# Patient Record
Sex: MALE | Race: WHITE | Employment: OTHER | URBAN - METROPOLITAN AREA
[De-identification: names, ages, dates, MRNs, and addresses within clinical notes are randomized per-mention and may not be internally consistent; named-entity substitution may affect disease eponyms.]

---

## 2017-01-16 ENCOUNTER — OFFICE VISIT (OUTPATIENT)
Dept: INTERNAL MEDICINE CLINIC | Age: 62
End: 2017-01-16

## 2017-01-16 VITALS
WEIGHT: 212.7 LBS | BODY MASS INDEX: 28.19 KG/M2 | HEIGHT: 73 IN | OXYGEN SATURATION: 98 % | RESPIRATION RATE: 16 BRPM | SYSTOLIC BLOOD PRESSURE: 130 MMHG | TEMPERATURE: 96.6 F | HEART RATE: 77 BPM | DIASTOLIC BLOOD PRESSURE: 70 MMHG

## 2017-01-16 DIAGNOSIS — M25.551 HIP PAIN, CHRONIC, RIGHT: Primary | ICD-10-CM

## 2017-01-16 DIAGNOSIS — Z23 ENCOUNTER FOR IMMUNIZATION: ICD-10-CM

## 2017-01-16 DIAGNOSIS — G89.29 HIP PAIN, CHRONIC, RIGHT: Primary | ICD-10-CM

## 2017-01-16 NOTE — PROGRESS NOTES
Chief Complaint   Patient presents with    Hip Pain     right     Reviewed record in preparation for visit and have obtained necessary documentation. Identified pt with two pt identifiers(name and ). Health Maintenance Due   Topic    ZOSTER VACCINE AGE 60>     COLONOSCOPY     INFLUENZA AGE 9 TO ADULT          Chief Complaint   Patient presents with    Hip Pain     right        Wt Readings from Last 3 Encounters:   17 212 lb 11.2 oz (96.5 kg)   08/10/16 207 lb (93.9 kg)   16 209 lb (94.8 kg)     Temp Readings from Last 3 Encounters:   17 96.6 °F (35.9 °C) (Oral)   08/10/16 98.6 °F (37 °C) (Oral)   16 98 °F (36.7 °C) (Oral)     BP Readings from Last 3 Encounters:   17 130/70   08/10/16 124/70   16 120/62     Pulse Readings from Last 3 Encounters:   17 77   08/10/16 67   16 78           Learning Assessment:  :     Learning Assessment 2015 3/6/2014   PRIMARY LEARNER Patient Patient   HIGHEST LEVEL OF EDUCATION - PRIMARY LEARNER  SOME COLLEGE SOME COLLEGE   BARRIERS PRIMARY LEARNER NONE NONE   CO-LEARNER CAREGIVER No No   PRIMARY LANGUAGE ENGLISH ENGLISH    NEED No No   LEARNER PREFERENCE PRIMARY DEMONSTRATION VIDEOS   LEARNING SPECIAL TOPICS no no   ANSWERED BY patient patient   RELATIONSHIP SELF SELF       Depression Screening:  :     PHQ 2 / 9, over the last two weeks 8/10/2016   Little interest or pleasure in doing things Not at all   Feeling down, depressed or hopeless Not at all   Total Score PHQ 2 0       Fall Risk Assessment:  :     No flowsheet data found. Abuse Screening:  :     Abuse Screening Questionnaire 2015 2014 3/6/2014   Do you ever feel afraid of your partner? N N N   Are you in a relationship with someone who physically or mentally threatens you? N N N   Is it safe for you to go home?  Lino Berman       Coordination of Care Questionnaire:  :     1) Have you been to an emergency room, urgent care clinic since your last visit? no   Hospitalized since your last visit? no             2) Have you seen or consulted any other health care providers outside of [address] [place] [address] since your last visit? no  (Include any pap smears or colon screenings in this section.)    3) Do you have an Advance Directive on file? no    4) Are you interested in receiving information on Advance Directives? NO      Patient is accompanied by self I have received verbal consent from Eve Chaves to discuss any/all medical information while they are present in the room. Reviewed record  In preparation for visit and have obtained necessary documentation.

## 2017-01-16 NOTE — PROGRESS NOTES
Hip Pain (right)       HPI:  Emil Jacobson is a 57y.o. year old male who is here to establish care. He  had his medical care:     He reports the following history and medical concerns:      Ganglion cyst, torn labrum, and arthritis. Right hip    Started 20 years ago- several cortisone shots and it helped. Living with discomfort 6-8 months. Moderate arthritis on MRI. Ortho suggests doing surgery to 'clean things up' as per patient and possibly needing hip replacement. Trouble putting pressure on it when walking. Not when laying down. No numbness or tingling. Brother had relief with acupuncture with me and he wanted to see if I can help him and avoid surgery. Assessment and Plan        1. Hip pain, chronic, right  Patient has good ROM but pain with walking on right hip that has caused also discomfort in left knee. I told him that acupuncture would not relieve any structural defects of his hip and may only reduce the discomfort or part of the discomfort when walking. I have not seen the MRI and therefore told him that he should correspond with his orthopedics doctor about surgery and if he can delay it to see if this helps. However, delaying surgery may make things worse and he would need to discuss that with them.     TIME OUT performed immediately prior to start of procedure:   Hilary Frazier MD, have performed the following reviews on Emil Jacobson   prior to the start of the procedure:     * Patient was identified by name and date of birth   * Agreement on procedure being performed was verified   * Risks and Benefits explained to the patient   * Procedure site verified and marked as necessary   * Patient was positioned for comfort   * Consent was given by patient    Date of procedure: 1/16/2017  Procedure performed by:Chan Figueroa MD   Patient assisted by: self   How tolerated by patient: tolerated the procedure well with no complications   Comments: none         Patient explained the risks and benefits of acupuncture to help with the acute problem. There can be some soreness afterwards and the effect can be immediate to slightly delayed (2-3 days). If the problem persists or gets worse, please call back or send a my chart message. If you experience shortness of breath, please let me know immediately. Patient agreed to proceed with treatment. Seirin packaged needle used No. 5 (0.25) 30 mm  QTY Five  Points palpated and inserted 5-10 mm at marked points  Patient tolerated procedure and felt partial relief. 2. Encounter for immunization  Immunization given. Discussed risks and benefits. Side effects. - Influenza vaccine (QUADRIVALENT VIAL) 3 years and older IM              Visit Vitals    /70 (BP 1 Location: Left arm, BP Patient Position: Sitting)    Pulse 77    Temp 96.6 °F (35.9 °C) (Oral)    Resp 16    Ht 6' 1\" (1.854 m)    Wt 212 lb 11.2 oz (96.5 kg)    SpO2 98%    BMI 28.06 kg/m2       Historical Data    Past Medical History   Diagnosis Date    Back problem     BPH (benign prostatic hyperplasia)     Elevated PSA measurement     H/O prostate biopsy      x 3    Hyperlipemia     Hypertension     Osteoarthritis     Prediabetes        Past Surgical History   Procedure Laterality Date    Hx orthopaedic       multiple sites - both knees    Hx orthopaedic Right 2014     R Hand trigger fingers 2 & 3    Hx other surgical Right May 2016     Retinal Tear    Hx orthopaedic Right 2010     Rot Cuff Rep & R elbow tendon    Hx other surgical  early 2000s     Prostate Bx x3    Hx tonsillectomy  1970    Hx cataract removal Bilateral 1/29/07 & 8/27/08    Hx other surgical Left 6/2016      ret[place] repair       Outpatient Encounter Prescriptions as of 1/16/2017   Medication Sig Dispense Refill    atorvastatin (LIPITOR) 40 mg tablet Take 1 Tab by mouth daily. 30 Tab 11    valsartan (DIOVAN) 160 mg tablet Take 1 Tab by mouth daily.  30 Tab 11    furosemide (LASIX) 40 mg tablet Take 1 Tab by mouth daily. 30 Tab 11    multivitamin (ONE A DAY) tablet Take 1 Tab by mouth daily.  alfuzosin SR (UROXATRAL) 10 mg SR tablet Take  by mouth daily.  aspirin delayed-release 81 mg tablet Take 1 Tab by mouth daily. 30 Tab 11     No facility-administered encounter medications on file as of 1/16/2017. No Known Allergies     Social History     Social History    Marital status:      Spouse name: N/A    Number of children: N/A    Years of education: N/A     Occupational History          Social History Main Topics    Smoking status: Current Every Day Smoker     Packs/day: 0.50     Years: 40.00     Types: Cigarettes    Smokeless tobacco: Never Used      Comment: E-Cig? - started in his teens    Alcohol use Yes      Comment: 1x/wk    Drug use: No    Sexual activity: Yes     Partners: Female     Other Topics Concern    Not on file     Social History Narrative        Review of Systems   Constitutional: Negative for chills and fever. Eyes: Negative for blurred vision. Cardiovascular: Negative for chest pain. Musculoskeletal: Positive for joint pain and myalgias. Negative for back pain and falls. Neurological: Negative for dizziness and tingling. All other systems reviewed and are negative. Physical Exam   Constitutional: He appears well-nourished. No distress. Abdominal: Soft. Bowel sounds are normal. He exhibits no distension and no mass. There is no tenderness. Musculoskeletal: Normal range of motion. He exhibits tenderness. He exhibits no edema or deformity. Right hip: He exhibits decreased strength and tenderness. He exhibits normal range of motion, no bony tenderness, no swelling, no crepitus, no deformity and no laceration. Legs:  Hip barrier points   GB 30  Spasm palpated on right hip all around it  Insertion not in hip joint.     dispersion      I spent 25 minutes with the patient and > 50% of the time was spent in counseling and coordination of care regarding acupuncture risks and benefits, what it can relieve, stretching. Using tiger balm. Orders Placed This Encounter    Influenza vaccine (QUADRIVALENT VIAL) 3 years and older IM        I have reviewed the patient's medical history in detail and updated the computerized patient record. We had a prolonged discussion about these complex clinical issues and went over the various important aspects to consider. All questions were answered. Advised him to call back or return to office if symptoms do not improve, change in nature, or persist.    He was given an after visit summary or informed of [facility] which includes patient instructions, diagnoses, current medications, & vitals. He expressed understanding with the diagnosis and plan.

## 2017-01-23 ENCOUNTER — OFFICE VISIT (OUTPATIENT)
Dept: INTERNAL MEDICINE CLINIC | Age: 62
End: 2017-01-23

## 2017-01-23 VITALS
SYSTOLIC BLOOD PRESSURE: 140 MMHG | TEMPERATURE: 98 F | HEIGHT: 73 IN | RESPIRATION RATE: 16 BRPM | DIASTOLIC BLOOD PRESSURE: 90 MMHG | WEIGHT: 209 LBS | OXYGEN SATURATION: 96 % | HEART RATE: 76 BPM | BODY MASS INDEX: 27.7 KG/M2

## 2017-01-23 DIAGNOSIS — M25.551 CHRONIC RIGHT HIP PAIN: Primary | ICD-10-CM

## 2017-01-23 DIAGNOSIS — G89.29 CHRONIC RIGHT HIP PAIN: Primary | ICD-10-CM

## 2017-01-23 NOTE — PROGRESS NOTES
Hip Pain (f/u hip pain)       HPI:  Liya Yo is a 57y.o. year old male who is here for a follow up visit. He was last seen by me on 1/16/2017. He reports the following:    Pain is half better. Stayed like that for the week. Using tiger balm and it helps. Very pleased as he drove many miles this week and is usually in a lot of pain. Assessment and Plan        1. Chronic right hip pain  TIME OUT performed immediately prior to start of procedure:   I, Nancie Gong MD, have performed the following reviews on Liya Yo   prior to the start of the procedure:     * Patient was identified by name and date of birth   * Agreement on procedure being performed was verified   * Risks and Benefits explained to the patient   * Procedure site verified and marked as necessary   * Patient was positioned for comfort   * Consent was given by patient    Date of procedure: 1/23/2017  Procedure performed by:Chan Mera MD   Patient assisted by: self   How tolerated by patient: tolerated the procedure well with no complications   Comments: none         Patient explained the risks and benefits of acupuncture to help with the acute problem. There can be some soreness afterwards and the effect can be immediate to slightly delayed (2-3 days). If the problem persists or gets worse, please call back or send a my chart message. If you experience shortness of breath, please let me know immediately. Patient agreed to proceed with treatment. Seirin packaged needle used No. 5 (0.25) 30 mm  QTY Five  Points palpated and inserted 5-10 mm at marked points  Patient tolerated procedure and felt more than baseline relief.         Visit Vitals    /90 (BP 1 Location: Left arm, BP Patient Position: Sitting)    Pulse 76    Temp 98 °F (36.7 °C) (Oral)    Resp 16    Ht 6' 1\" (1.854 m)    Wt 209 lb (94.8 kg)    SpO2 96%    BMI 27.57 kg/m2       Historical Data    Past Medical History   Diagnosis Date    Back problem  BPH (benign prostatic hyperplasia)     Elevated PSA measurement     H/O prostate biopsy      x 3    Hyperlipemia     Hypertension     Osteoarthritis     Prediabetes        Past Surgical History   Procedure Laterality Date    Hx orthopaedic       multiple sites - both knees    Hx orthopaedic Right 2014     R Hand trigger fingers 2 & 3    Hx other surgical Right May 2016     Retinal Tear    Hx orthopaedic Right 2010     Rot Cuff Rep & R elbow tendon    Hx other surgical  early 2000s     Prostate Bx x3    Hx tonsillectomy  1970    Hx cataract removal Bilateral 1/29/07 & 8/27/08    Hx other surgical Left 6/2016      retna repair       Outpatient Encounter Prescriptions as of 1/23/2017   Medication Sig Dispense Refill    atorvastatin (LIPITOR) 40 mg tablet Take 1 Tab by mouth daily. 30 Tab 11    valsartan (DIOVAN) 160 mg tablet Take 1 Tab by mouth daily. 30 Tab 11    furosemide (LASIX) 40 mg tablet Take 1 Tab by mouth daily. 30 Tab 11    aspirin delayed-release 81 mg tablet Take 1 Tab by mouth daily. 30 Tab 11    multivitamin (ONE A DAY) tablet Take 1 Tab by mouth daily.  alfuzosin SR (UROXATRAL) 10 mg SR tablet Take  by mouth daily. No facility-administered encounter medications on file as of 1/23/2017. No Known Allergies     Social History     Social History    Marital status:      Spouse name: N/A    Number of children: N/A    Years of education: N/A     Occupational History          Social History Main Topics    Smoking status: Current Every Day Smoker     Packs/day: 0.50     Years: 40.00     Types: Cigarettes    Smokeless tobacco: Never Used      Comment: E-Cig? - started in his teens    Alcohol use Yes      Comment: 1x/wk    Drug use: No    Sexual activity: Yes     Partners: Female     Other Topics Concern    Not on file     Social History Narrative        Review of Systems   Constitutional: Negative for fever.    Genitourinary: Negative for dysuria. Musculoskeletal: Positive for joint pain. Negative for myalgias. Neurological: Negative for dizziness. Physical Exam   Constitutional: No distress. Abdominal: Soft. Musculoskeletal:        Legs:  Skin: Skin is warm and dry. Psychiatric: He has a normal mood and affect. Thought content normal.          No orders of the defined types were placed in this encounter. I have reviewed the patient's medical history in detail and updated the computerized patient record. We had a prolonged discussion about these complex clinical issues and went over the various important aspects to consider. All questions were answered. Advised him to call back or return to office if symptoms do not improve, change in nature, or persist.    He was given an after visit summary or informed of [facility] [facility] which includes patient instructions, diagnoses, current medications, & vitals. He expressed understanding with the diagnosis and plan.

## 2017-01-23 NOTE — PROGRESS NOTES
Chief Complaint   Patient presents with    Hip Pain     f/u better     Reviewed record in preparation for visit and have obtained necessary documentation. Identified pt with two pt identifiers(name and ). Health Maintenance Due   Topic    ZOSTER VACCINE AGE 60>     COLONOSCOPY          Chief Complaint   Patient presents with    Hip Pain     f/u better        Wt Readings from Last 3 Encounters:   17 209 lb (94.8 kg)   17 212 lb 11.2 oz (96.5 kg)   08/10/16 207 lb (93.9 kg)     Temp Readings from Last 3 Encounters:   17 98 °F (36.7 °C) (Oral)   17 96.6 °F (35.9 °C) (Oral)   08/10/16 98.6 °F (37 °C) (Oral)     BP Readings from Last 3 Encounters:   17 140/90   17 130/70   08/10/16 124/70     Pulse Readings from Last 3 Encounters:   17 76   17 77   08/10/16 67           Learning Assessment:  :     Learning Assessment 2015 3/6/2014   PRIMARY LEARNER Patient Patient   HIGHEST LEVEL OF EDUCATION - PRIMARY LEARNER  SOME COLLEGE SOME COLLEGE   BARRIERS PRIMARY LEARNER NONE NONE   CO-LEARNER CAREGIVER No No   PRIMARY LANGUAGE ENGLISH ENGLISH    NEED No No   LEARNER PREFERENCE PRIMARY DEMONSTRATION VIDEOS   LEARNING SPECIAL TOPICS no no   ANSWERED BY patient patient   RELATIONSHIP SELF SELF       Depression Screening:  :     PHQ 2 / 9, over the last two weeks 8/10/2016   Little interest or pleasure in doing things Not at all   Feeling down, depressed or hopeless Not at all   Total Score PHQ 2 0       Fall Risk Assessment:  :     No flowsheet data found. Abuse Screening:  :     Abuse Screening Questionnaire 2015 2014 3/6/2014   Do you ever feel afraid of your partner? N N N   Are you in a relationship with someone who physically or mentally threatens you? N N N   Is it safe for you to go home?  Davin Menon       Coordination of Care Questionnaire:  :     1) Have you been to an emergency room, urgent care clinic since your last visit? no Hospitalized since your last visit? no             2) Have you seen or consulted any other health care providers outside of [address] [place] [address] since your last visit? no  (Include any pap smears or colon screenings in this section.)    3) Do you have an Advance Directive on file? no    4) Are you interested in receiving information on Advance Directives? NO      Patient is accompanied by self I have received verbal consent from Stefano Skiff to discuss any/all medical information while they are present in the room. Reviewed record  In preparation for visit and have obtained necessary documentation.

## 2017-01-26 NOTE — PROGRESS NOTES
Hip Pain (f/u hip pain)       HPI:  Nj Adames is a 57y.o. year old male who is here for a follow up visit. He was last seen by me on 1/16/2017. He reports the following:     Pain is half better. Stayed like that for the week. Using tiger balm and it helps. Very pleased as he drove many miles this week and is usually in a lot of pain.            Assessment and Plan       1. Chronic right hip pain- doing better with acupuncture. Releasing all of the tightness around area and groin. TIME OUT performed immediately prior to start of procedure:   Mary Glover MD, have performed the following reviews on Nj Adames  prior to the start of the procedure:      * Patient was identified by name and date of birth   * Agreement on procedure being performed was verified   * Risks and Benefits explained to the patient   * Procedure site verified and marked as necessary   * Patient was positioned for comfort   * Consent was given by patient     Date of procedure: 1/23/2017  Procedure performed by:Chan Aguero MD   Patient assisted by: self   How tolerated by patient: tolerated the procedure well with no complications   Comments: none            Patient explained the risks and benefits of acupuncture to help with the acute problem. There can be some soreness afterwards and the effect can be immediate to slightly delayed (2-3 days). If the problem persists or gets worse, please call back or send a my chart message. If you experience shortness of breath, please let me know immediately. Patient agreed to proceed with treatment.      Seirin packaged needle used No. 5 (0.25) 30 mm QTY Five  Points palpated and inserted 5-10 mm at marked points  Patient tolerated procedure and felt more than baseline relief.      Visit Vitals    /90 (BP 1 Location: Left arm, BP Patient Position: Sitting)    Pulse 76    Temp 98 °F (36.7 °C) (Oral)    Resp 16    Ht 6' 1\" (1.854 m)    Wt 209 lb (94.8 kg)    SpO2 96%    BMI 27.57 kg/m2       Historical Data    Past Medical History   Diagnosis Date    Back problem     BPH (benign prostatic hyperplasia)     Elevated PSA measurement     H/O prostate biopsy      x 3    Hyperlipemia     Hypertension     Osteoarthritis     Prediabetes        Past Surgical History   Procedure Laterality Date    Hx orthopaedic       multiple sites - both knees    Hx orthopaedic Right 2014     R Hand trigger fingers 2 & 3    Hx other surgical Right May 2016     Retinal Tear    Hx orthopaedic Right 2010     Rot Cuff Rep & R elbow tendon    Hx other surgical  early 2000s     Prostate Bx x3    Hx tonsillectomy  1970    Hx cataract removal Bilateral 1/29/07 & 8/27/08    Hx other surgical Left 6/2016      retna repair       Outpatient Encounter Prescriptions as of 1/23/2017   Medication Sig Dispense Refill    atorvastatin (LIPITOR) 40 mg tablet Take 1 Tab by mouth daily. 30 Tab 11    valsartan (DIOVAN) 160 mg tablet Take 1 Tab by mouth daily. 30 Tab 11    furosemide (LASIX) 40 mg tablet Take 1 Tab by mouth daily. 30 Tab 11    aspirin delayed-release 81 mg tablet Take 1 Tab by mouth daily. 30 Tab 11    multivitamin (ONE A DAY) tablet Take 1 Tab by mouth daily.  alfuzosin SR (UROXATRAL) 10 mg SR tablet Take  by mouth daily. No facility-administered encounter medications on file as of 1/23/2017.          No Known Allergies     Social History     Social History    Marital status:      Spouse name: N/A    Number of children: N/A    Years of education: N/A     Occupational History          Social History Main Topics    Smoking status: Current Every Day Smoker     Packs/day: 0.50     Years: 40.00     Types: Cigarettes    Smokeless tobacco: Never Used      Comment: E-Cig? - started in his teens    Alcohol use Yes      Comment: 1x/wk    Drug use: No    Sexual activity: Yes     Partners: Female     Other Topics Concern    Not on file     Social History Narrative Review of Systems   Constitutional: Negative for weight loss. Eyes: Negative for blurred vision. Respiratory: Negative for shortness of breath. Cardiovascular: Negative for chest pain. Gastrointestinal: Negative for abdominal pain. Genitourinary: Negative for dysuria and frequency. Skin: Negative for rash. Neurological: Negative for dizziness, weakness and headaches. Physical Exam   Constitutional: He is oriented to person, place, and time. No distress. HENT:   Head: Normocephalic. Eyes: Conjunctivae are normal.   Neck: No thyromegaly present. Cardiovascular: Normal rate and regular rhythm. Musculoskeletal: Normal range of motion. He exhibits no edema or deformity. Legs:  Neurological: He is alert and oriented to person, place, and time. Vitals reviewed. No orders of the defined types were placed in this encounter. I have reviewed the patient's medical history in detail and updated the computerized patient record. We had a prolonged discussion about these complex clinical issues and went over the various important aspects to consider. All questions were answered. Advised him to call back or return to office if symptoms do not improve, change in nature, or persist.    He was given an after visit summary or informed of [employer] Access which includes patient instructions, diagnoses, current medications, & vitals. He expressed understanding with the diagnosis and plan.

## 2017-02-06 ENCOUNTER — OFFICE VISIT (OUTPATIENT)
Dept: INTERNAL MEDICINE CLINIC | Age: 62
End: 2017-02-06

## 2017-02-06 VITALS
BODY MASS INDEX: 27.51 KG/M2 | DIASTOLIC BLOOD PRESSURE: 76 MMHG | SYSTOLIC BLOOD PRESSURE: 120 MMHG | HEART RATE: 76 BPM | OXYGEN SATURATION: 97 % | RESPIRATION RATE: 16 BRPM | WEIGHT: 207.6 LBS | HEIGHT: 73 IN | TEMPERATURE: 98.4 F

## 2017-02-06 DIAGNOSIS — M25.551 RIGHT HIP PAIN: Primary | ICD-10-CM

## 2017-02-06 NOTE — PROGRESS NOTES
Hip Pain       HPI:  Camryn Wright is a 57y.o. year old male who is here for a follow up visit. He was last seen by me on 1/23/2017. He reports the following:    Right Hip is better. 1-2 out of 10. Left knee is also 50% better. Was not compensating for weak right hip. Can put on his own socks now! Assessment and Plan        1. Right hip pain  Spasm palpated and tenderness at greater trochanter and bursa of hip. Acup. 3 pts. TIME OUT performed immediately prior to start of procedure:   Lucas Hilario MD, have performed the following reviews on Camryn Wright   prior to the start of the procedure:     * Patient was identified by name and date of birth   * Agreement on procedure being performed was verified   * Risks and Benefits explained to the patient   * Procedure site verified and marked as necessary   * Patient was positioned for comfort   * Consent was given by patient    Date of procedure: 2/6/2017  Procedure performed by:Chan Veloz MD   Patient assisted by: self   How tolerated by patient: tolerated the procedure well with no complications   Comments: none         Patient explained the risks and benefits of acupuncture to help with the acute problem. There can be some soreness afterwards and the effect can be immediate to slightly delayed (2-3 days). If the problem persists or gets worse, please call back or send a my chart message. If you experience shortness of breath, please let me know immediately. Patient agreed to proceed with treatment. Seirin packaged needle used No. 5 (0.25) 30 mm  QTY 5  Points palpated and inserted 5-10 mm at marked points  Patient tolerated procedure and felt great relief.                      Visit Vitals    /76 (BP 1 Location: Left arm, BP Patient Position: Sitting)    Pulse 76    Temp 98.4 °F (36.9 °C) (Oral)    Resp 16    Ht 6' 1\" (1.854 m)    Wt 207 lb 9.6 oz (94.2 kg)    SpO2 97%    BMI 27.39 kg/m2       Historical Data    Past Medical History   Diagnosis Date    Back problem     BPH (benign prostatic hyperplasia)     Elevated PSA measurement     H/O prostate biopsy      x 3    Hyperlipemia     Hypertension     Osteoarthritis     Prediabetes        Past Surgical History   Procedure Laterality Date    Hx orthopaedic       multiple sites - both knees    Hx orthopaedic Right 2014     R Hand trigger fingers 2 & 3    Hx other surgical Right May 2016     Retinal Tear    Hx orthopaedic Right 2010     Rot Cuff Rep & R elbow tendon    Hx other surgical  early 2000s     Prostate Bx x3    Hx tonsillectomy  1970    Hx cataract removal Bilateral 1/29/07 & 8/27/08    Hx other surgical Left 6/2016      retna repair       Outpatient Encounter Prescriptions as of 2/6/2017   Medication Sig Dispense Refill    atorvastatin (LIPITOR) 40 mg tablet Take 1 Tab by mouth daily. 30 Tab 11    valsartan (DIOVAN) 160 mg tablet Take 1 Tab by mouth daily. 30 Tab 11    furosemide (LASIX) 40 mg tablet Take 1 Tab by mouth daily. 30 Tab 11    aspirin delayed-release 81 mg tablet Take 1 Tab by mouth daily. 30 Tab 11    multivitamin (ONE A DAY) tablet Take 1 Tab by mouth daily.  alfuzosin SR (UROXATRAL) 10 mg SR tablet Take  by mouth daily. No facility-administered encounter medications on file as of 2/6/2017.          No Known Allergies     Social History     Social History    Marital status:      Spouse name: N/A    Number of children: N/A    Years of education: N/A     Occupational History          Social History Main Topics    Smoking status: Current Every Day Smoker     Packs/day: 0.50     Years: 40.00     Types: Cigarettes    Smokeless tobacco: Never Used      Comment: E-Cig? - started in his teens    Alcohol use Yes      Comment: 1x/wk    Drug use: No    Sexual activity: Yes     Partners: Female     Other Topics Concern    Not on file     Social History Narrative        Review of Systems   Constitutional: Negative for weight loss. Eyes: Negative for blurred vision. Respiratory: Negative for shortness of breath. Cardiovascular: Negative for chest pain. Gastrointestinal: Negative for abdominal pain. Genitourinary: Negative for dysuria and frequency. Skin: Negative for rash. Neurological: Negative for dizziness, weakness and headaches. Physical Exam   Constitutional: No distress. Musculoskeletal:        Legs:         No orders of the defined types were placed in this encounter. I have reviewed the patient's medical history in detail and updated the computerized patient record. We had a prolonged discussion about these complex clinical issues and went over the various important aspects to consider. All questions were answered. Advised him to call back or return to office if symptoms do not improve, change in nature, or persist.    He was given an after visit summary or informed of [employer] Access which includes patient instructions, diagnoses, current medications, & vitals. He expressed understanding with the diagnosis and plan.

## 2017-03-07 ENCOUNTER — OFFICE VISIT (OUTPATIENT)
Dept: INTERNAL MEDICINE CLINIC | Age: 62
End: 2017-03-07

## 2017-03-07 VITALS
SYSTOLIC BLOOD PRESSURE: 132 MMHG | OXYGEN SATURATION: 98 % | TEMPERATURE: 97.3 F | BODY MASS INDEX: 27.89 KG/M2 | WEIGHT: 210.4 LBS | RESPIRATION RATE: 18 BRPM | DIASTOLIC BLOOD PRESSURE: 80 MMHG | HEIGHT: 73 IN

## 2017-03-07 DIAGNOSIS — M25.551 PAIN OF RIGHT HIP JOINT: Primary | ICD-10-CM

## 2017-03-07 DIAGNOSIS — Z00.00 ROUTINE GENERAL MEDICAL EXAMINATION AT A HEALTH CARE FACILITY: ICD-10-CM

## 2017-03-07 NOTE — PROGRESS NOTES
Chief Complaint   Patient presents with    Back Pain     accupunture     Reviewed record in preparation for visit and have obtained necessary documentation. Identified pt with two pt identifiers(name and ). Health Maintenance Due   Topic    Hepatitis C Screening     ZOSTER VACCINE AGE 60>     COLONOSCOPY          Chief Complaint   Patient presents with    Back Pain     accupunture        Wt Readings from Last 3 Encounters:   17 210 lb 6.4 oz (95.4 kg)   17 207 lb 9.6 oz (94.2 kg)   17 209 lb (94.8 kg)     Temp Readings from Last 3 Encounters:   17 97.3 °F (36.3 °C) (Oral)   17 98.4 °F (36.9 °C) (Oral)   17 98 °F (36.7 °C) (Oral)     BP Readings from Last 3 Encounters:   17 132/80   17 120/76   17 140/90     Pulse Readings from Last 3 Encounters:   17 76   17 76   17 77           Learning Assessment:  :     Learning Assessment 2015 3/6/2014   PRIMARY LEARNER Patient Patient   HIGHEST LEVEL OF EDUCATION - PRIMARY LEARNER  SOME COLLEGE SOME COLLEGE   BARRIERS PRIMARY LEARNER NONE NONE   CO-LEARNER CAREGIVER No No   PRIMARY LANGUAGE ENGLISH ENGLISH    NEED No No   LEARNER PREFERENCE PRIMARY DEMONSTRATION VIDEOS   LEARNING SPECIAL TOPICS no no   ANSWERED BY patient patient   RELATIONSHIP SELF SELF       Depression Screening:  :     PHQ 2 / 9, over the last two weeks 8/10/2016   Little interest or pleasure in doing things Not at all   Feeling down, depressed or hopeless Not at all   Total Score PHQ 2 0       Fall Risk Assessment:  :     No flowsheet data found. Abuse Screening:  :     Abuse Screening Questionnaire 2015 2014 3/6/2014   Do you ever feel afraid of your partner? N N N   Are you in a relationship with someone who physically or mentally threatens you? N N N   Is it safe for you to go home?  Maryjane Talbert       Coordination of Care Questionnaire:  :     1) Have you been to an emergency room, urgent care clinic since your last visit? no   Hospitalized since your last visit? no             2) Have you seen or consulted any other health care providers outside of [address] [place] [address] since your last visit? no  (Include any pap smears or colon screenings in this section.)    3) Do you have an Advance Directive on file? no    4) Are you interested in receiving information on Advance Directives? NO      Patient is accompanied by self I have received verbal consent from Melina Hilton to discuss any/all medical information while they are present in the room. Reviewed record  In preparation for visit and have obtained necessary documentation.

## 2017-03-07 NOTE — PROGRESS NOTES
Primary Care Doctor is: Neo Crews MD    Back Pain (accupunture)         HPI:  Antonina Donnelly is a 57y.o. year old male who is here for an acute care visit and has the following concerns:    Zero out of 10 today but some drives 1.5 hr.          Assessment and Plan        1. Pain of right hip joint  No treatment needed. Doing so well. Schedule ev in 6 months. Stop smoking. 2. Routine general medical examination at a health care facility  Schedule in 6 months with future labs. - CBC WITH AUTOMATED DIFF; Future  - METABOLIC PANEL, COMPREHENSIVE; Future  - LIPID PANEL; Future  - PSA TOTAL (REFLEX TO FREE); Future  - TSH 3RD GENERATION; Future  - MICROALBUMIN, UR, RAND W/ MICROALBUMIN/CREA RATIO; Future  - VITAMIN D, 25 HYDROXY; Future      Visit Vitals    /80 (BP 1 Location: Left arm, BP Patient Position: Sitting)    Temp 97.3 °F (36.3 °C) (Oral)    Resp 18    Ht 6' 1\" (1.854 m)    Wt 210 lb 6.4 oz (95.4 kg)    SpO2 98%    BMI 27.76 kg/m2       Historical Data    Past Medical History:   Diagnosis Date    Back problem     BPH (benign prostatic hyperplasia)     Elevated PSA measurement     H/O prostate biopsy     x 3    Hyperlipemia     Hypertension     Osteoarthritis     Prediabetes        Past Surgical History:   Procedure Laterality Date    HX CATARACT REMOVAL Bilateral 1/29/07 & 8/27/08    HX ORTHOPAEDIC      multiple sites - both knees    HX ORTHOPAEDIC Right 2014    R Hand trigger fingers 2 & 3    HX ORTHOPAEDIC Right 2010    Rot Cuff Rep & R elbow tendon    HX OTHER SURGICAL Right May 2016    Retinal Tear    HX OTHER SURGICAL  early 2000s    Prostate Bx x3    HX OTHER SURGICAL Left 6/2016     retna repair    HX TONSILLECTOMY  1970       Outpatient Encounter Prescriptions as of 3/7/2017   Medication Sig Dispense Refill    atorvastatin (LIPITOR) 40 mg tablet Take 1 Tab by mouth daily. 30 Tab 11    valsartan (DIOVAN) 160 mg tablet Take 1 Tab by mouth daily.  30 Tab 11    furosemide (LASIX) 40 mg tablet Take 1 Tab by mouth daily. 30 Tab 11    aspirin delayed-release 81 mg tablet Take 1 Tab by mouth daily. 30 Tab 11    multivitamin (ONE A DAY) tablet Take 1 Tab by mouth daily.  alfuzosin SR (UROXATRAL) 10 mg SR tablet Take  by mouth daily. No facility-administered encounter medications on file as of 3/7/2017. No Known Allergies     Social History     Social History    Marital status:      Spouse name: N/A    Number of children: N/A    Years of education: N/A     Occupational History          Social History Main Topics    Smoking status: Current Every Day Smoker     Packs/day: 0.50     Years: 40.00     Types: Cigarettes    Smokeless tobacco: Never Used      Comment: E-Cig? - started in his teens    Alcohol use Yes      Comment: 1x/wk    Drug use: No    Sexual activity: Yes     Partners: Female     Other Topics Concern    Not on file     Social History Narrative        ROS        Physical Exam        No orders of the defined types were placed in this encounter. I have reviewed the patient's medical history in detail and updated the computerized patient record. We had a prolonged discussion about these complex clinical issues and went over the various important aspects to consider. All questions were answered. Advised him to call back or return to office if symptoms do not improve, change in nature, or persist.    He was given an after visit summary or informed of [employer] Access which includes patient instructions, diagnoses, current medications, & vitals. He expressed understanding with the diagnosis and plan.

## 2017-08-07 ENCOUNTER — OFFICE VISIT (OUTPATIENT)
Dept: INTERNAL MEDICINE CLINIC | Age: 62
End: 2017-08-07

## 2017-08-07 VITALS
WEIGHT: 210 LBS | RESPIRATION RATE: 16 BRPM | HEART RATE: 68 BPM | OXYGEN SATURATION: 96 % | DIASTOLIC BLOOD PRESSURE: 100 MMHG | SYSTOLIC BLOOD PRESSURE: 160 MMHG | TEMPERATURE: 98.6 F | BODY MASS INDEX: 27.83 KG/M2 | HEIGHT: 73 IN

## 2017-08-07 DIAGNOSIS — N40.1 BENIGN PROSTATIC HYPERPLASIA WITH LOWER URINARY TRACT SYMPTOMS, UNSPECIFIED MORPHOLOGY: ICD-10-CM

## 2017-08-07 DIAGNOSIS — I10 ESSENTIAL HYPERTENSION, BENIGN: Primary | ICD-10-CM

## 2017-08-07 RX ORDER — VALSARTAN 320 MG/1
320 TABLET ORAL DAILY
Qty: 30 TAB | Refills: 12 | Status: SHIPPED | OUTPATIENT
Start: 2017-08-07 | End: 2018-02-02 | Stop reason: SDUPTHER

## 2017-08-07 RX ORDER — FINASTERIDE 5 MG/1
5 TABLET, FILM COATED ORAL DAILY
COMMUNITY
Start: 2017-07-10 | End: 2017-10-05 | Stop reason: ALTCHOICE

## 2017-08-07 RX ORDER — CLOMIPHENE CITRATE 50 MG/1
50 TABLET ORAL DAILY
COMMUNITY
End: 2019-01-02

## 2017-08-07 NOTE — PROGRESS NOTES
Chief Complaint   Patient presents with   Arjun Garcia Prostatitis     urologist has delayed surgery 1 month. patient would like it done asap      1. Have you been to the ER, urgent care clinic since your last visit? Hospitalized since your last visit? Yes When: isaiah astorga 5/8/17    2. Have you seen or consulted any other health care providers outside of the [address] [place] [address] since your last visit? Include any pap smears or colon screening.  Yes When: [facility]

## 2017-08-07 NOTE — PROGRESS NOTES
Primary Care Doctor is:  Juan Graham MD    Prostatitis (urologist has delayed surgery 1 month. patient would like it done asap )         HPI:  Gualberto Lee is a 57y.o. year old male who is here for an acute care visit and has the following concerns:    MRI prostate last week. - prostate enlarged. 80 to 100 increase in size. Cath for 3 months. Urology- Lisette Shannon    160/80 on repeat left arm- me        Assessment and Plan        1. Essential hypertension, benign  Increase from 160 mg to   - valsartan (DIOVAN) 320 mg tablet; Take 1 Tab by mouth daily. Dispense: 30 Tab; Refill: 12    2. Benign prostatic hyperplasia with lower urinary tract symptoms, unspecified morphology  Given another name for urologist.  He was disappointed that his urologist did not want to do surgery until next month and not find him someone else to do it. He denies infection  Offered him testing.                 Visit Vitals    BP (!) 160/100 (BP 1 Location: Left arm, BP Patient Position: Sitting)    Pulse 68    Temp 98.6 °F (37 °C) (Oral)    Resp 16    Ht 6' 1\" (1.854 m)    Wt 210 lb (95.3 kg)    SpO2 96%    BMI 27.71 kg/m2       Historical Data    Past Medical History:   Diagnosis Date    Back problem     BPH (benign prostatic hyperplasia)     Elevated PSA measurement     H/O prostate biopsy     x 3    Hyperlipemia     Hypertension     Osteoarthritis     Prediabetes        Past Surgical History:   Procedure Laterality Date    HX CATARACT REMOVAL Bilateral 1/29/07 & 8/27/08    HX ORTHOPAEDIC      multiple sites - both knees    HX ORTHOPAEDIC Right 2014    R Hand trigger fingers 2 & 3    HX ORTHOPAEDIC Right 2010    Rot Cuff Rep & R elbow tendon    HX OTHER SURGICAL Right May 2016    Retinal Tear    HX OTHER SURGICAL  early 2000s    Prostate Bx x3    HX OTHER SURGICAL Left 6/2016     retna repair    HX TONSILLECTOMY  1970       Outpatient Encounter Prescriptions as of 8/7/2017   Medication Sig Dispense Refill  QUERCETIN DIHYDRATE, BULK, by Does Not Apply route.  clomiPHENE (CLOMID) 50 mg tablet Take 50 mg by mouth daily.  finasteride (PROSCAR) 5 mg tablet Take 5 mg by mouth daily.  atorvastatin (LIPITOR) 40 mg tablet Take 1 Tab by mouth daily. 30 Tab 11    valsartan (DIOVAN) 160 mg tablet Take 1 Tab by mouth daily. 30 Tab 11    furosemide (LASIX) 40 mg tablet Take 1 Tab by mouth daily. 30 Tab 11    aspirin delayed-release 81 mg tablet Take 1 Tab by mouth daily. 30 Tab 11    multivitamin (ONE A DAY) tablet Take 1 Tab by mouth daily.  alfuzosin SR (UROXATRAL) 10 mg SR tablet Take  by mouth daily. No facility-administered encounter medications on file as of 8/7/2017. No Known Allergies     Social History     Social History    Marital status:      Spouse name: N/A    Number of children: N/A    Years of education: N/A     Occupational History          Social History Main Topics    Smoking status: Current Every Day Smoker     Packs/day: 0.50     Years: 40.00     Types: Cigarettes    Smokeless tobacco: Never Used      Comment: E-Cig? - started in his teens    Alcohol use Yes      Comment: 1x/wk    Drug use: No    Sexual activity: Yes     Partners: Female     Other Topics Concern    Not on file     Social History Narrative        family history includes Cancer in his father; Crohn's Disease in his sister; Diabetes in his father and mother; Heart Attack (age of onset: 36) in his father; Heart Disease in his mother. Review of Systems   Constitutional: Negative for weight loss. Eyes: Negative for blurred vision. Respiratory: Negative for shortness of breath. Cardiovascular: Negative for chest pain. Gastrointestinal: Negative for abdominal pain. Genitourinary: Negative for dysuria and frequency. Skin: Negative for rash. Neurological: Negative for dizziness, focal weakness, weakness and headaches.    Endo/Heme/Allergies: Negative for environmental allergies. Does not bruise/bleed easily. Physical Exam   Constitutional: He is oriented to person, place, and time and well-developed, well-nourished, and in no distress. No distress. Eyes: Conjunctivae are normal.   Neck: Neck supple. Cardiovascular: Normal rate and regular rhythm. Pulmonary/Chest: Effort normal and breath sounds normal.   Abdominal: Soft. Bowel sounds are normal.   Musculoskeletal: He exhibits no edema. Neurological: He is alert and oriented to person, place, and time. Skin: Skin is warm and dry. No rash noted. Ortho Exam           I have reviewed the patient's medical history in detail and updated the computerized patient record. We had a prolonged discussion about these complex clinical issues and went over the various important aspects to consider. All questions were answered. Advised him to call back or return to office if symptoms do not improve, change in nature, or persist.    He was given an after visit summary or informed of [employer] Access which includes patient instructions, diagnoses, current medications, & vitals. He expressed understanding with the diagnosis and plan.

## 2017-09-03 DIAGNOSIS — Z00.00 ROUTINE GENERAL MEDICAL EXAMINATION AT A HEALTH CARE FACILITY: ICD-10-CM

## 2017-09-16 LAB
25(OH)D3+25(OH)D2 SERPL-MCNC: 22 NG/ML (ref 30–100)
ALBUMIN SERPL-MCNC: 4.2 G/DL (ref 3.6–4.8)
ALBUMIN/CREAT UR: 229 MG/G CREAT (ref 0–30)
ALBUMIN/GLOB SERPL: 1.6 {RATIO} (ref 1.2–2.2)
ALP SERPL-CCNC: 65 IU/L (ref 39–117)
ALT SERPL-CCNC: 22 IU/L (ref 0–44)
AST SERPL-CCNC: 13 IU/L (ref 0–40)
BASOPHILS # BLD AUTO: 0.1 X10E3/UL (ref 0–0.2)
BASOPHILS NFR BLD AUTO: 1 %
BILIRUB SERPL-MCNC: 0.5 MG/DL (ref 0–1.2)
BUN SERPL-MCNC: 16 MG/DL (ref 8–27)
BUN/CREAT SERPL: 19 (ref 10–24)
CALCIUM SERPL-MCNC: 9 MG/DL (ref 8.6–10.2)
CHLORIDE SERPL-SCNC: 105 MMOL/L (ref 96–106)
CHOLEST SERPL-MCNC: 119 MG/DL (ref 100–199)
CO2 SERPL-SCNC: 25 MMOL/L (ref 18–29)
CREAT SERPL-MCNC: 0.86 MG/DL (ref 0.76–1.27)
CREAT UR-MCNC: 36.2 MG/DL
EOSINOPHIL # BLD AUTO: 0.1 X10E3/UL (ref 0–0.4)
EOSINOPHIL NFR BLD AUTO: 2 %
ERYTHROCYTE [DISTWIDTH] IN BLOOD BY AUTOMATED COUNT: 13.4 % (ref 12.3–15.4)
GLOBULIN SER CALC-MCNC: 2.6 G/DL (ref 1.5–4.5)
GLUCOSE SERPL-MCNC: 127 MG/DL (ref 65–99)
HCT VFR BLD AUTO: 44.7 % (ref 37.5–51)
HDLC SERPL-MCNC: 34 MG/DL
HGB BLD-MCNC: 14.4 G/DL (ref 12.6–17.7)
IMM GRANULOCYTES # BLD: 0 X10E3/UL (ref 0–0.1)
IMM GRANULOCYTES NFR BLD: 0 %
INTERPRETATION, 910389: NORMAL
LDLC SERPL CALC-MCNC: 60 MG/DL (ref 0–99)
LYMPHOCYTES # BLD AUTO: 1.5 X10E3/UL (ref 0.7–3.1)
LYMPHOCYTES NFR BLD AUTO: 18 %
MCH RBC QN AUTO: 30.3 PG (ref 26.6–33)
MCHC RBC AUTO-ENTMCNC: 32.2 G/DL (ref 31.5–35.7)
MCV RBC AUTO: 94 FL (ref 79–97)
MICROALBUMIN UR-MCNC: 82.9 UG/ML
MONOCYTES # BLD AUTO: 0.4 X10E3/UL (ref 0.1–0.9)
MONOCYTES NFR BLD AUTO: 5 %
NEUTROPHILS # BLD AUTO: 6.1 X10E3/UL (ref 1.4–7)
NEUTROPHILS NFR BLD AUTO: 74 %
PLATELET # BLD AUTO: 208 X10E3/UL (ref 150–379)
POTASSIUM SERPL-SCNC: 4.3 MMOL/L (ref 3.5–5.2)
PROT SERPL-MCNC: 6.8 G/DL (ref 6–8.5)
PSA SERPL-MCNC: 0.5 NG/ML (ref 0–4)
RBC # BLD AUTO: 4.76 X10E6/UL (ref 4.14–5.8)
REFLEX CRITERIA: NORMAL
SODIUM SERPL-SCNC: 145 MMOL/L (ref 134–144)
TRIGL SERPL-MCNC: 126 MG/DL (ref 0–149)
TSH SERPL DL<=0.005 MIU/L-ACNC: 1.15 UIU/ML (ref 0.45–4.5)
VLDLC SERPL CALC-MCNC: 25 MG/DL (ref 5–40)
WBC # BLD AUTO: 8.1 X10E3/UL (ref 3.4–10.8)

## 2017-09-19 ENCOUNTER — OFFICE VISIT (OUTPATIENT)
Dept: INTERNAL MEDICINE CLINIC | Age: 62
End: 2017-09-19

## 2017-09-19 VITALS
SYSTOLIC BLOOD PRESSURE: 130 MMHG | RESPIRATION RATE: 16 BRPM | DIASTOLIC BLOOD PRESSURE: 80 MMHG | WEIGHT: 206 LBS | HEIGHT: 73 IN | OXYGEN SATURATION: 98 % | BODY MASS INDEX: 27.3 KG/M2 | HEART RATE: 70 BPM | TEMPERATURE: 97.9 F

## 2017-09-19 DIAGNOSIS — Z00.00 ROUTINE GENERAL MEDICAL EXAMINATION AT A HEALTH CARE FACILITY: Primary | ICD-10-CM

## 2017-09-19 DIAGNOSIS — E55.9 VITAMIN D DEFICIENCY: ICD-10-CM

## 2017-09-19 DIAGNOSIS — F17.200 SMOKER: ICD-10-CM

## 2017-09-19 DIAGNOSIS — Z12.11 COLON CANCER SCREENING: ICD-10-CM

## 2017-09-19 DIAGNOSIS — I70.90 ATHEROSCLEROSIS: ICD-10-CM

## 2017-09-19 DIAGNOSIS — Z23 ENCOUNTER FOR IMMUNIZATION: ICD-10-CM

## 2017-09-19 DIAGNOSIS — Z87.891 HISTORY OF TOBACCO USE: ICD-10-CM

## 2017-09-19 DIAGNOSIS — R73.03 PREDIABETES: ICD-10-CM

## 2017-09-19 LAB — HBA1C MFR BLD HPLC: 5.7 %

## 2017-09-19 NOTE — PROGRESS NOTES
HPI:  Eliecer Brumfield is a 57y.o. year old male who is here for an annual physical:    Wt Readings from Last 3 Encounters:   09/19/17 206 lb (93.4 kg)   08/07/17 210 lb (95.3 kg)   03/07/17 210 lb 6.4 oz (95.4 kg)     Temp Readings from Last 3 Encounters:   09/19/17 97.9 °F (36.6 °C) (Oral)   08/07/17 98.6 °F (37 °C) (Oral)   03/07/17 97.3 °F (36.3 °C) (Oral)     BP Readings from Last 3 Encounters:   09/19/17 130/80   08/07/17 (!) 160/100   03/07/17 132/80     Pulse Readings from Last 3 Encounters:   09/19/17 70   08/07/17 68   02/06/17 76        He reports the following history and medical concerns: Had resection of prostate. Doing well. HTN- was dizzy one time.  on his machine. Assessment and Plan        1. Routine general medical examination at a health care facility  Well exam except for his smoking. Prostate issue resolved with surgery. 2. Encounter for immunization  Immunization given. Discussed risks and benefits. Side effects. VIS given through visit summary via [facility] or paper copy if not on Mychart   - Influenza virus vaccine (QUADRIVALENT PRES FREE SYRINGE) IM (03708)  - ND IMMUNIZ ADMIN,1 SINGLE/COMB VAC/TOXOID    3. Prediabetes  Glycemic handout discussed and addressed again or given to patient in print out. Preventing Diabetes or improving a1c cannot be done just by foods, but regular exercise and weight loss- at least 150 minutes of exercise involving resistance training and cardio. No Carbs at night is ideal.   - AMB POC HEMOGLOBIN A1C    4. History of tobacco use  It is very important that he quit smoking. There are various alternatives available to help with this difficult task, but first and foremost, he must make a firm commitment and decision to quit. The nature of nicotine addiction is discussed and that a program like Quit Smart has proven to be the most helpful. The usefulness of behavioral therapy is discussed and suggested.     Patient is aware that He can discuss with me anytime if he decides he wants to quit smoking or discuss medications like zyban or chantix for assistance. The long term consequences of smoking besides cancer, but heart disease, loss of limb/amputation and chronic oxygen therapy were made known. 5. Colon cancer screening  Referral given to patient and emphasized importance for patient to schedule the appointment by calling the number on the paperwork. If there is any difficulty getting an appointment, please let us know. Not making this appointment can have serious consequences of delayed diagnosis or irreversible health defects.   - REFERRAL FOR COLONOSCOPY    6. Vitamin D deficiency  Patient compliant with Vit D. Emphasized importance of taking with food and not too much because it can be toxic to our body. Therefore, it should be checked regularly. Levels ordered. 7. Smoker  As above. Needs screening.  - CT LOW DOSE LUNG CANCER SCREENING; Future    8. Atherosclerosis  Last CT showed 3 V possible disease. Had negative stress test.   - DUPLEX CAROTID BILATERAL;  Future        Historical Data    Past Medical History:   Diagnosis Date    Back problem     BPH (benign prostatic hyperplasia)     Elevated PSA measurement     H/O prostate biopsy     x 3    Hyperlipemia     Hypertension     Osteoarthritis     Prediabetes        Past Surgical History:   Procedure Laterality Date    HX CATARACT REMOVAL Bilateral 1/29/07 & 8/27/08    HX ORTHOPAEDIC      multiple sites - both knees    HX ORTHOPAEDIC Right 2014    R Hand trigger fingers 2 & 3    HX ORTHOPAEDIC Right 2010    Rot Cuff Rep & R elbow tendon    HX OTHER SURGICAL Right May 2016    Retinal Tear    HX OTHER SURGICAL  early 2000s    Prostate Bx x3    HX OTHER SURGICAL Left 6/2016     retna repair    HX TONSILLECTOMY  1970       Outpatient Encounter Prescriptions as of 9/19/2017   Medication Sig Dispense Refill    finasteride (PROSCAR) 5 mg tablet Take 5 mg by mouth daily.  valsartan (DIOVAN) 320 mg tablet Take 1 Tab by mouth daily. 30 Tab 12    atorvastatin (LIPITOR) 40 mg tablet Take 1 Tab by mouth daily. 30 Tab 11    furosemide (LASIX) 40 mg tablet Take 1 Tab by mouth daily. 30 Tab 11    aspirin delayed-release 81 mg tablet Take 1 Tab by mouth daily. 30 Tab 11    multivitamin (ONE A DAY) tablet Take 1 Tab by mouth daily.  alfuzosin SR (UROXATRAL) 10 mg SR tablet Take  by mouth daily.  QUERCETIN DIHYDRATE, BULK, by Does Not Apply route.  clomiPHENE (CLOMID) 50 mg tablet Take 50 mg by mouth daily. No facility-administered encounter medications on file as of 9/19/2017. No Known Allergies     Social History     Social History    Marital status:      Spouse name: N/A    Number of children: N/A    Years of education: N/A     Occupational History          Social History Main Topics    Smoking status: Current Every Day Smoker     Packs/day: 0.50     Years: 40.00     Types: Cigarettes    Smokeless tobacco: Never Used      Comment: E-Cig? - started in his teens    Alcohol use Yes      Comment: 1x/wk    Drug use: No    Sexual activity: Yes     Partners: Female     Other Topics Concern    Not on file     Social History Narrative        family history includes Cancer in his father; Crohn's Disease in his sister; Diabetes in his father and mother; Heart Attack (age of onset: 36) in his father; Heart Disease in his mother. Review of Systems   Constitutional: Negative for weight loss. Eyes: Negative for blurred vision. Respiratory: Negative for shortness of breath. Cardiovascular: Negative for chest pain. Gastrointestinal: Negative for abdominal pain. Genitourinary: Negative for dysuria and frequency. Skin: Negative for rash. Neurological: Negative for dizziness, focal weakness, weakness and headaches. Endo/Heme/Allergies: Negative for environmental allergies. Does not bruise/bleed easily. Visit Vitals    /80 (BP 1 Location: Left arm, BP Patient Position: Sitting)    Pulse 70    Temp 97.9 °F (36.6 °C) (Oral)    Resp 16    Ht 6' 1\" (1.854 m)    Wt 206 lb (93.4 kg)    SpO2 98%    BMI 27.18 kg/m2         Physical Exam   Constitutional: He is oriented to person, place, and time and well-developed, well-nourished, and in no distress. No distress. Eyes: Conjunctivae are normal.   Neck: Neck supple. Cardiovascular: Normal rate and regular rhythm. Pulmonary/Chest: Effort normal and breath sounds normal.   Abdominal: Soft. Bowel sounds are normal.   Musculoskeletal: He exhibits no edema. Neurological: He is alert and oriented to person, place, and time. Skin: Skin is warm and dry. No rash noted. Ortho Exam     Assessment:  See Above for discussion/plan. Annual Physical completed. I have reviewed the patient's medical history in detail and updated the computerized patient record. We had a prolonged discussion about these complex clinical issues and went over the various important aspects to consider. All questions were answered. Advised him to call back or return to office if symptoms do not improve, change in nature, or persist.    He was given an after visit summary or informed of [employer] Access which includes patient instructions, diagnoses, current medications, & vitals. He expressed understanding with the diagnosis and plan.

## 2017-09-19 NOTE — PATIENT INSTRUCTIONS
Vaccine Information Statement    Influenza (Flu) Vaccine (Inactivated or Recombinant): What you need to know    Many Vaccine Information Statements are available in [place] sor[place] and other languages. See www.immunize.org/vis  Hojas de Información Sobre Vacunas están disponibles en Español y en muchos otros idiomas. Visite www.immunize.org/vis    1. Why get vaccinated? Influenza (flu) is a contagious disease that spreads around the [place] every year, usually between October and May. Flu is caused by influenza viruses, and is spread mainly by coughing, sneezing, and close contact. Anyone can get flu. Flu strikes suddenly and can last several days. Symptoms vary by age, but can include:   fever/chills   sore throat   muscle aches   fatigue   cough   headache    runny or stuffy nose    Flu can also lead to pneumonia and blood infections, and cause diarrhea and seizures in children. If you have a medical condition, such as heart or lung disease, flu can make it worse. Flu is more dangerous for some people. Infants and young children, people 72years of age and older, pregnant women, and people with certain health conditions or a weakened immune system are at greatest risk. Each year thousands of people in the [employer] die from flu, and many more are hospitalized. Flu vaccine can:   keep you from getting flu,   make flu less severe if you do get it, and   keep you from spreading flu to your family and other people. 2. Inactivated and recombinant flu vaccines    A dose of flu vaccine is recommended every flu season. Children 6 months through 6years of age may need two doses during the same flu season. Everyone else needs only one dose each flu season.        Some inactivated flu vaccines contain a very small amount of a mercury-based preservative called thimerosal. Studies have not shown thimerosal in vaccines to be harmful, but flu vaccines that do not contain thimerosal are available. There is no live flu virus in flu shots. They cannot cause the flu. There are many flu viruses, and they are always changing. Each year a new flu vaccine is made to protect against three or four viruses that are likely to cause disease in the upcoming flu season. But even when the vaccine doesnt exactly match these viruses, it may still provide some protection    Flu vaccine cannot prevent:   flu that is caused by a virus not covered by the vaccine, or   illnesses that look like flu but are not. It takes about 2 weeks for protection to develop after vaccination, and protection lasts through the flu season. 3. Some people should not get this vaccine    Tell the person who is giving you the vaccine:     If you have any severe, life-threatening allergies. If you ever had a life-threatening allergic reaction after a dose of flu vaccine, or have a severe allergy to any part of this vaccine, you may be advised not to get vaccinated. Most, but not all, types of flu vaccine contain a small amount of egg protein.  If you ever had Guillain-Barré Syndrome (also called GBS). Some people with a history of GBS should not get this vaccine. This should be discussed with your doctor.  If you are not feeling well. It is usually okay to get flu vaccine when you have a mild illness, but you might be asked to come back when you feel better. 4. Risks of a vaccine reaction    With any medicine, including vaccines, there is a chance of reactions. These are usually mild and go away on their own, but serious reactions are also possible. Most people who get a flu shot do not have any problems with it.      Minor problems following a flu shot include:    soreness, redness, or swelling where the shot was given     hoarseness   sore, red or itchy eyes   cough   fever   aches   headache   itching   fatigue  If these problems occur, they usually begin soon after the shot and last 1 or 2 days. More serious problems following a flu shot can include the following:     There may be a small increased risk of Guillain-Barré Syndrome (GBS) after inactivated flu vaccine. This risk has been estimated at 1 or 2 additional cases per million people vaccinated. This is much lower than the risk of severe complications from flu, which can be prevented by flu vaccine.  Young children who get the flu shot along with pneumococcal vaccine (PCV13) and/or DTaP vaccine at the same time might be slightly more likely to have a seizure caused by fever. Ask your doctor for more information. Tell your doctor if a child who is getting flu vaccine has ever had a seizure. Problems that could happen after any injected vaccine:      People sometimes faint after a medical procedure, including vaccination. Sitting or lying down for about 15 minutes can help prevent fainting, and injuries caused by a fall. Tell your doctor if you feel dizzy, or have vision changes or ringing in the ears.  Some people get severe pain in the shoulder and have difficulty moving the arm where a shot was given. This happens very rarely.  Any medication can cause a severe allergic reaction. Such reactions from a vaccine are very rare, estimated at about 1 in a million doses, and would happen within a few minutes to a few hours after the vaccination. As with any medicine, there is a very remote chance of a vaccine causing a serious injury or death. The safety of vaccines is always being monitored. For more information, visit: www.cdc.gov/vaccinesafety/    5. What if there is a serious reaction? What should I look for?  Look for anything that concerns you, such as signs of a severe allergic reaction, very high fever, or unusual behavior.     Signs of a severe allergic reaction can include hives, swelling of the face and throat, difficulty breathing, a fast heartbeat, dizziness, and weakness  usually within a few minutes to a few hours after the vaccination. What should I do?  If you think it is a severe allergic reaction or other emergency that cant wait, call 9-1-1 and get the person to the nearest hospital. Otherwise, call your doctor.  Reactions should be reported to the Vaccine Adverse Event Reporting System (VAERS). Your doctor should file this report, or you can do it yourself through  the VAERS web site at www.vaers. [facility].gov, or by calling 9-765.506.5864. VAERS does not give medical advice. 6. The National Vaccine Injury Compensation Program    The [facility] Vaccine Injury Compensation Program (VICP) is a federal program that was created to compensate people who may have been injured by certain vaccines. Persons who believe they may have been injured by a vaccine can learn about the program and about filing a claim by calling 0-636.129.5952 or visiting the [employer] website at www.[facility].gov/vaccinecompensation. There is a time limit to file a claim for compensation. 7. How can I learn more?  Ask your healthcare provider. He or she can give you the vaccine package insert or suggest other sources of information.  Call your local or state health department.  Contact the Centers for Disease Control and Prevention (CDC):  - Call 4-732.911.4264 (1-800-CDC-INFO) or  - Visit CDCs website at www.cdc.gov/flu    Vaccine Information Statement   Inactivated Influenza Vaccine   8/7/2015  42 HARRY Floyd [id number]-[id number]    Department of Health and Human Services  Centers for Disease Control and Prevention    Office Use Only

## 2017-09-19 NOTE — PROGRESS NOTES
Chief Complaint   Patient presents with    Complete Physical     Reviewed record in preparation for visit and have obtained necessary documentation. Identified pt with two pt identifiers(name and ). Health Maintenance Due   Topic    Hepatitis C Screening     ZOSTER VACCINE AGE 60>     COLONOSCOPY          Chief Complaint   Patient presents with    Complete Physical        Wt Readings from Last 3 Encounters:   17 206 lb (93.4 kg)   17 210 lb (95.3 kg)   17 210 lb 6.4 oz (95.4 kg)     Temp Readings from Last 3 Encounters:   17 97.9 °F (36.6 °C) (Oral)   17 98.6 °F (37 °C) (Oral)   17 97.3 °F (36.3 °C) (Oral)     BP Readings from Last 3 Encounters:   17 130/80   17 (!) 160/100   17 132/80     Pulse Readings from Last 3 Encounters:   17 70   17 68   17 76           Learning Assessment:  :     Learning Assessment 2015 3/6/2014   PRIMARY LEARNER Patient Patient   HIGHEST LEVEL OF EDUCATION - PRIMARY LEARNER  SOME COLLEGE SOME COLLEGE   BARRIERS PRIMARY LEARNER NONE NONE   CO-LEARNER CAREGIVER No No   PRIMARY LANGUAGE ENGLISH ENGLISH    NEED No No   LEARNER PREFERENCE PRIMARY DEMONSTRATION VIDEOS   LEARNING SPECIAL TOPICS no no   ANSWERED BY patient patient   RELATIONSHIP SELF SELF       Depression Screening:  :     PHQ over the last two weeks 2017   Little interest or pleasure in doing things Not at all   Feeling down, depressed or hopeless Not at all   Total Score PHQ 2 0       Fall Risk Assessment:  :     No flowsheet data found. Abuse Screening:  :     Abuse Screening Questionnaire 2015 2014 3/6/2014   Do you ever feel afraid of your partner? N N N   Are you in a relationship with someone who physically or mentally threatens you? N N N   Is it safe for you to go home? Dominic Stark       Coordination of Care Questionnaire:  :     1) Have you been to an emergency room, urgent care clinic since your last visit? no   Hospitalized since your last visit? no             2) Have you seen or consulted any other health care providers outside of [facility] since your last visit? no  (Include any pap smears or colon screenings in this section.)    3) Do you have an Advance Directive on file? no    4) Are you interested in receiving information on Advance Directives? NO      Patient is accompanied by self I have received verbal consent from Melissa Mendez to discuss any/all medical information while they are present in the room. Reviewed record  In preparation for visit and have obtained necessary documentation.

## 2017-09-19 NOTE — LETTER
9/19/2017 1:49 PM 
 
Mr. Gualberto Balderas [address] [facility] [id number] Dear Gualberto Lee: Please find your most recent results below. Resulted Orders CBC WITH AUTOMATED DIFF Result Value Ref Range WBC 8.1 3.4 - 10.8 x10E3/uL  
 RBC 4.76 4.14 - 5.80 x10E6/uL HGB 14.4 12.6 - 17.7 g/dL HCT 44.7 37.5 - 51.0 % MCV 94 79 - 97 fL  
 MCH 30.3 26.6 - 33.0 pg  
 MCHC 32.2 31.5 - 35.7 g/dL  
 RDW 13.4 12.3 - 15.4 % PLATELET 265 716 - 734 x10E3/uL NEUTROPHILS 74 % Lymphocytes 18 % MONOCYTES 5 % EOSINOPHILS 2 % BASOPHILS 1 %  
 ABS. NEUTROPHILS 6.1 1.4 - 7.0 x10E3/uL Abs Lymphocytes 1.5 0.7 - 3.1 x10E3/uL  
 ABS. MONOCYTES 0.4 0.1 - 0.9 x10E3/uL  
 ABS. EOSINOPHILS 0.1 0.0 - 0.4 x10E3/uL  
 ABS. BASOPHILS 0.1 0.0 - 0.2 x10E3/uL IMMATURE GRANULOCYTES 0 %  
 ABS. IMM. GRANS. 0.0 0.0 - 0.1 x10E3/uL Narrative Performed at:  [address]  [id number] : Isaias Maddox MD, Phone:  [phone] METABOLIC PANEL, COMPREHENSIVE Result Value Ref Range Glucose 127 (H) 65 - 99 mg/dL BUN 16 8 - 27 mg/dL Creatinine 0.86 0.76 - 1.27 mg/dL GFR est non-AA 93 >59 mL/min/1.73 GFR est  >59 mL/min/1.73  
 BUN/Creatinine ratio 19 10 - 24 Sodium 145 (H) 134 - 144 mmol/L Potassium 4.3 3.5 - 5.2 mmol/L Chloride 105 96 - 106 mmol/L  
 CO2 25 18 - 29 mmol/L Calcium 9.0 8.6 - 10.2 mg/dL Protein, total 6.8 6.0 - 8.5 g/dL Albumin 4.2 3.6 - 4.8 g/dL GLOBULIN, TOTAL 2.6 1.5 - 4.5 g/dL A-G Ratio 1.6 1.2 - 2.2 Bilirubin, total 0.5 0.0 - 1.2 mg/dL Alk. phosphatase 65 39 - 117 IU/L  
 AST (SGOT) 13 0 - 40 IU/L  
 ALT (SGPT) 22 0 - 44 IU/L Narrative Performed at:  [address]  [id number] : Isaias Maddox MD, Phone:  [phone] LIPID PANEL Result Value Ref Range Cholesterol, total 119 100 - 199 mg/dL Triglyceride 126 0 - 149 mg/dL HDL Cholesterol 34 (L) >39 mg/dL VLDL, calculated 25 5 - 40 mg/dL LDL, calculated 60 0 - 99 mg/dL Narrative Performed at:  [address]  [id number] : Georgia Kent MD, Phone:  [phone] MICROALBUMIN, UR, RAND Result Value Ref Range Creatinine, urine 36.2 Not Estab. mg/dL Microalbumin, urine 82.9 Not Estab. ug/mL Microalb/Creat ratio (ug/mg creat.) 229.0 (H) 0.0 - 30.0 mg/g creat Narrative Performed at:  [address]  [id number] : Georgia Kent MD, Phone:  [phone] PSA TOTAL (REFLEX TO FREE) Result Value Ref Range Prostate Specific Ag 0.5 0.0 - 4.0 ng/mL Comment:  
   Roche ECLIA methodology. According to the American Urological Association, Serum PSA should 
decrease and remain at undetectable levels after radical 
prostatectomy. The AUA defines biochemical recurrence as an initial 
PSA value 0.2 ng/mL or greater followed by a subsequent confirmatory PSA value 0.2 ng/mL or greater. Values obtained with different assay methods or kits cannot be used 
interchangeably. Results cannot be interpreted as absolute evidence 
of the presence or absence of malignant disease. Reflex Criteria Comment Comment:  
   The percent free PSA is performed on a reflex basis only when the 
total PSA is between 4.0 and 10.0 ng/mL. Narrative Performed at:  [address]  [id number] : Georgia Kent MD, Phone:  [phone] TSH 3RD GENERATION Result Value Ref Range TSH 1.150 0.450 - 4.500 uIU/mL Narrative Performed at:  [address]  [id number] : Georgia Kent MD, Phone:  [phone] VITAMIN D, 25 HYDROXY Result Value Ref Range VITAMIN D, 25-HYDROXY 22.0 (L) 30.0 - 100.0 ng/mL Comment:  
   Vitamin D deficiency has been defined by the [facility] practice guideline as a 
level of serum 25-OH vitamin D less than 20 ng/mL (1,2). The Endocrine Society went on to further define vitamin D 
insufficiency as a level between 21 and 29 ng/mL (2). 1. IOM ([place] of Medicine). 2010. Dietary reference 
   intakes for calcium and D. [facility]: The 
   [employer]. 2. Karl MF, Robert NC, Boby PORTILLO, et al. 
   Evaluation, treatment, and prevention of vitamin D 
   deficiency: an Endocrine Society clinical practice 
   guideline. JCEM. 2011 Jul; 96(7):1911-30. Narrative Performed at:  [address]  [id number] : Khai Hurd MD, Phone:  [phone] CVD REPORT Result Value Ref Range INTERPRETATION Note Comment:  
   Supplement report is available. Narrative Performed at:  [address] [place] [address]  [id number] : Kye Mathis PhD, Phone:  [phone] RECOMMENDATIONS: 
Discussed at office visit. Please let me know if you have questions. Please call me if you have any questions: [phone] Sincerely, Lisette Valdez MD

## 2017-09-27 ENCOUNTER — HOSPITAL ENCOUNTER (OUTPATIENT)
Dept: VASCULAR SURGERY | Age: 62
Discharge: HOME | End: 2017-09-27
Attending: INTERNAL MEDICINE
Payer: COMMERCIAL

## 2017-09-27 ENCOUNTER — HOSPITAL ENCOUNTER (OUTPATIENT)
Dept: CT IMAGING | Age: 62
Discharge: HOME | End: 2017-09-27
Attending: INTERNAL MEDICINE
Payer: SELF-PAY

## 2017-09-27 DIAGNOSIS — T81.82XS EMPHYSEMA (SUBCUTANEOUS) (SURGICAL) RESULTING FROM A PROCEDURE, SEQUELA: Primary | ICD-10-CM

## 2017-09-27 DIAGNOSIS — I70.90 ATHEROSCLEROSIS: ICD-10-CM

## 2017-09-27 DIAGNOSIS — F17.200 SMOKER: ICD-10-CM

## 2017-09-27 PROCEDURE — 93880 EXTRACRANIAL BILAT STUDY: CPT

## 2017-09-27 PROCEDURE — G0297 LDCT FOR LUNG CA SCREEN: HCPCS

## 2017-09-27 NOTE — PROGRESS NOTES
The CT scan showed no nodules but you had emphysema on your lungs.   I think we should do a pulmonary function test which I will order to evaluate your lung function  Message sent to patient via RABBL:  September 27, 2017

## 2017-09-27 NOTE — PROCEDURES
[address]  *** FINAL REPORT ***    Name: Yury Ramos  MRN: [id number]    Outpatient  : 07 Dec 1955  HIS Order #: [id number]  [address] [facility] Visit #: [id number]  Date: 27 Sep 2017    TYPE OF TEST: Cerebrovascular Duplex    REASON FOR TEST  Carotid bruit    Right Carotid:-             Proximal               Mid                 Distal  cm/s  Systolic  Diastolic  Systolic  Diastolic  Systolic  Diastolic  CCA:    566.7      23.0                            84.0      18.0  Bulb:  ECA:    105.0  ICA:     56.0      19.0                            81.0      23.0  ICA/CCA:  0.7       1.1    ICA Stenosis:    Right Vertebral:-  Finding:  Sys:       40.0  Berenice:    Right Subclavian:    Left Carotid:-            Proximal                Mid                 Distal  cm/s  Systolic  Diastolic  Systolic  Diastolic  Systolic  Diastolic  CCA:    137.0      21.0                            87.0      23.0  Bulb:  ECA:     50.0  ICA:     59.0      22.0                            59.0      26.0  ICA/CCA:  0.7       1.0    ICA Stenosis:    Left Vertebral:-  Finding: Antegrade  Sys:       44.0  Berenice:    Left Subclavian:    INTERPRETATION/FINDINGS  PROCEDURE:  Color duplex ultrasound imaging of extracranial  cerebrovascular arteries. FINDINGS:       Right:  Internal carotid velocity is within normal limits. There   is narrowing of the internal carotid flow channel on color Doppler  imaging and mixed density plaque on B-mode imaging, consistent with  less than 50 percent stenosis. The common and external carotid  arteries are patent and without evidence of hemodynamically  significant stenosis. Left:   Internal carotid velocity is within normal limits. There   is narrowing of the internal carotid flow channel on color Doppler  imaging and mixed density plaque on B-mode imaging, consistent with  less than 50 percent stenosis.   The common and external carotid  arteries are patent and without evidence of hemodynamically  significant stenosis. IMPRESSION:  Consistent with less than 50% stenosis (low end) of the  internal carotids. Vertebrals are patent with antegrade flow. ADDITIONAL COMMENTS    I have personally reviewed the data relevant to the interpretation of  this  study.     TECHNOLOGIST: Emre Nunez RVT  Signed: 09/27/2017 09:47 AM    PHYSICIAN: Ismael Simmons., MD  Signed: 09/28/2017 08:17 AM

## 2017-09-29 NOTE — PROGRESS NOTES
There was some plaque disease on the arteries in the carotids. Being on a statin and aspirin is important to prevent more plaque, but stop smoking is the key to reduce the risk of stroke.

## 2017-10-04 ENCOUNTER — HOSPITAL ENCOUNTER (OUTPATIENT)
Dept: PULMONOLOGY | Age: 62
Discharge: HOME | End: 2017-10-04
Attending: INTERNAL MEDICINE
Payer: COMMERCIAL

## 2017-10-04 VITALS — HEART RATE: 57 BPM

## 2017-10-04 DIAGNOSIS — T81.82XS EMPHYSEMA (SUBCUTANEOUS) (SURGICAL) RESULTING FROM A PROCEDURE, SEQUELA: ICD-10-CM

## 2017-10-04 PROCEDURE — 94060 EVALUATION OF WHEEZING: CPT

## 2017-10-04 PROCEDURE — 94729 DIFFUSING CAPACITY: CPT

## 2017-10-04 PROCEDURE — 74011000250 HC RX REV CODE- 250: Performed by: INTERNAL MEDICINE

## 2017-10-04 RX ORDER — ALBUTEROL SULFATE 0.83 MG/ML
2.5 SOLUTION RESPIRATORY (INHALATION)
Status: COMPLETED | OUTPATIENT
Start: 2017-10-04 | End: 2017-10-04

## 2017-10-04 RX ADMIN — ALBUTEROL SULFATE 2.5 MG: 2.5 SOLUTION RESPIRATORY (INHALATION) at 14:16

## 2017-10-05 ENCOUNTER — OFFICE VISIT (OUTPATIENT)
Dept: INTERNAL MEDICINE CLINIC | Age: 62
End: 2017-10-05

## 2017-10-05 VITALS
TEMPERATURE: 98.4 F | HEART RATE: 74 BPM | OXYGEN SATURATION: 98 % | SYSTOLIC BLOOD PRESSURE: 130 MMHG | HEIGHT: 73 IN | BODY MASS INDEX: 27.7 KG/M2 | RESPIRATION RATE: 18 BRPM | DIASTOLIC BLOOD PRESSURE: 70 MMHG | WEIGHT: 209 LBS

## 2017-10-05 DIAGNOSIS — M70.71 ILIOPSOAS BURSITIS OF RIGHT HIP: Primary | ICD-10-CM

## 2017-10-05 DIAGNOSIS — Z87.891 HISTORY OF TOBACCO USE: ICD-10-CM

## 2017-10-05 DIAGNOSIS — I77.9 BILATERAL CAROTID ARTERY DISEASE (HCC): ICD-10-CM

## 2017-10-05 DIAGNOSIS — J43.9 PULMONARY EMPHYSEMA, UNSPECIFIED EMPHYSEMA TYPE (HCC): ICD-10-CM

## 2017-10-05 NOTE — PROGRESS NOTES
Chief Complaint   Patient presents with    Hypertension    Pain (Chronic)     Pt c/o hip pain right, pain level 4         1. Have you been to the ER, urgent care clinic since your last visit? No  Hospitalized since your last visit? No    2. Have you seen or consulted any other health care providers outside of the [facility] since your last visit? No Include any pap smears or colon screening.  No     Patient received flu shot 2 weeks ago

## 2017-10-06 NOTE — PROGRESS NOTES
Primary Care Doctor is:  Kvng Diez MD    Hypertension and Pain (Chronic) (Pt c/o hip pain right, pain level 4 )         HPI:  Susan Aguilar is a 57y.o. year old male who is here for an acute care visit and has the following concerns:    Right hip pain started to emerge since urological surgery. Saw Dr. Donato Chang and post op he is doing well. Stopped his prostate medications. Tender to walk and his left knee acts up like it did last time. No radiating pain. No weakness. Discussed CT showing likely emphysema. Preliminary PFT reports showed mild obstruction in large vessels. It is very important that he quit smoking. There are various alternatives available to help with this difficult task, but first and foremost, he must make a firm commitment and decision to quit. The nature of nicotine addiction is discussed and that a program like Quit Smart has proven to be the most helpful. The usefulness of behavioral therapy is discussed and suggested. Patient is aware that He can discuss with me anytime if he decides he wants to quit smoking or discuss medications like zyban or chantix for assistance. The long term consequences of smoking besides cancer, but heart disease, loss of limb/amputation and chronic oxygen therapy were made known. Discussed carotid results and plaque. MUST stop smoking. BP doing well. Tolerating diovan. No dizziness. Assessment and Plan        1. Bilateral carotid artery disease ([address]  Stay on aspirin and statin. Please stop smoking to prevent stroke. 2. Pulmonary emphysema, unspecified emphysema type ([address]  CT negative for nodules. PFT final pending. Mild obstruction. Low DLCO. 3. History of tobacco use  Discussed above findings. All point to his stopping smoking. Very critical now.     4. Iliopsoas bursitis of right hip  TIME OUT performed immediately prior to start of procedure:   I, Billie Castillo MD, have performed the following reviews on Matthew Crow Remonia Grew   prior to the start of the procedure:     * Patient was identified by name and date of birth   * Agreement on procedure being performed was verified   * Risks and Benefits explained to the patient   * Procedure site verified and marked as necessary   * Patient was positioned for comfort   * Consent was given by patient    Date of procedure: 10/5/2017  Procedure performed by:Chan Aguero MD   Patient assisted by: self   How tolerated by patient: tolerated the procedure well with no complications   Comments: none         Patient explained the risks and benefits of acupuncture to help with the acute problem. There can be some soreness afterwards and the effect can be immediate to slightly delayed (2-3 days). If the problem persists or gets worse, please call back or send a my chart message. If you experience shortness of breath, please let me know immediately. Patient agreed to proceed with treatment. Seirin packaged needle used No. 5 (0.25) 30 mm  QTY 4  50 hz x 15 min. Points palpated and inserted 5-10 mm at marked points  Patient tolerated procedure and felt partia relief.                 Visit Vitals    /70 (BP 1 Location: Left arm, BP Patient Position: Sitting)    Pulse 74    Temp 98.4 °F (36.9 °C) (Oral)    Resp 18    Ht 6' 1\" (1.854 m)    Wt 209 lb (94.8 kg)    SpO2 98%    BMI 27.57 kg/m2       Historical Data    Past Medical History:   Diagnosis Date    Back problem     BPH (benign prostatic hyperplasia)     Elevated PSA measurement     H/O prostate biopsy     x 3    Hyperlipemia     Hypertension     Osteoarthritis     Prediabetes        Past Surgical History:   Procedure Laterality Date    HX CATARACT REMOVAL Bilateral 1/29/07 & 8/27/08    HX ORTHOPAEDIC      multiple sites - both knees    HX ORTHOPAEDIC Right 2014    R Hand trigger fingers 2 & 3    HX ORTHOPAEDIC Right 2010    Rot Cuff Rep & R elbow tendon    HX OTHER SURGICAL Right May 2016    Retinal Tear    HX OTHER SURGICAL  early 2000s    Prostate Bx x3    HX OTHER SURGICAL Left 6/2016     retna repair    HX TONSILLECTOMY  1970       Outpatient Encounter Prescriptions as of 10/5/2017   Medication Sig Dispense Refill    clomiPHENE (CLOMID) 50 mg tablet Take 50 mg by mouth daily.  finasteride (PROSCAR) 5 mg tablet Take 5 mg by mouth daily.  valsartan (DIOVAN) 320 mg tablet Take 1 Tab by mouth daily. 30 Tab 12    atorvastatin (LIPITOR) 40 mg tablet Take 1 Tab by mouth daily. 30 Tab 11    aspirin delayed-release 81 mg tablet Take 1 Tab by mouth daily. 30 Tab 11    multivitamin (ONE A DAY) tablet Take 1 Tab by mouth daily.  alfuzosin SR (UROXATRAL) 10 mg SR tablet Take  by mouth daily.  QUERCETIN DIHYDRATE, BULK, by Does Not Apply route.  furosemide (LASIX) 40 mg tablet Take 1 Tab by mouth daily. 30 Tab 11     No facility-administered encounter medications on file as of 10/5/2017. No Known Allergies     Social History     Social History    Marital status:      Spouse name: N/A    Number of children: N/A    Years of education: N/A     Occupational History          Social History Main Topics    Smoking status: Current Every Day Smoker     Packs/day: 0.50     Years: 40.00     Types: Cigarettes    Smokeless tobacco: Never Used      Comment: E-Cig? - started in his teens    Alcohol use Yes      Comment: 1x/wk    Drug use: No    Sexual activity: Yes     Partners: Female     Other Topics Concern    Not on file     Social History Narrative        family history includes Cancer in his father; Crohn's Disease in his sister; Diabetes in his father and mother; Heart Attack (age of onset: 36) in his father; Heart Disease in his mother. Review of Systems   Constitutional: Negative for weight loss. Eyes: Negative for blurred vision. Respiratory: Negative for shortness of breath. Cardiovascular: Negative for chest pain and leg swelling.    Gastrointestinal: Negative for abdominal pain. Genitourinary: Negative for dysuria and frequency. Musculoskeletal: Positive for joint pain. Skin: Negative for rash. Neurological: Negative for dizziness, focal weakness, weakness and headaches. Endo/Heme/Allergies: Negative for environmental allergies. Does not bruise/bleed easily. Physical Exam   Constitutional: He is oriented to person, place, and time and well-developed, well-nourished, and in no distress. No distress. Eyes: Conjunctivae are normal.   Neck: Neck supple. Cardiovascular: Normal rate and regular rhythm. Pulmonary/Chest: Effort normal and breath sounds normal.   Abdominal: Soft. Bowel sounds are normal.   Musculoskeletal: He exhibits no edema. Right hip: He exhibits bony tenderness. He exhibits normal range of motion, normal strength and no swelling. Legs:  Neurological: He is alert and oriented to person, place, and time. Skin: Skin is warm and dry. No rash noted. Ortho Exam           I have reviewed the patient's medical history in detail and updated the computerized patient record. We had a prolonged discussion about these complex clinical issues and went over the various important aspects to consider. All questions were answered. Advised him to call back or return to office if symptoms do not improve, change in nature, or persist.    He was given an after visit summary or informed of [facility] [facility] which includes patient instructions, diagnoses, current medications, & vitals. He expressed understanding with the diagnosis and plan.

## 2017-10-06 NOTE — PROCEDURES
[address] [facility] [address]   [address] [facility], [address]   [address] [facility]       Name:  Yumiko Cleveland   MR#:  [id number]   :  1955   Account #:  [de-identified]    Date of Procedure:     Date of Adm:  10/04/2017       PROCEDURE: Spirometry. CLINICAL DIAGNOSIS/INDICATIONS:     INTERPRETATION: FEV1 to FVC ratio is 63 with an FEV1 of 102%. DLCO is within normal limits. No significant change with   bronchodilators. All in all, patient showing that he does have some   obstructive disease. However, FEV1 is within normal limits. Flow-  volume loop is consistent with obstructive disease. Patient does meet   criteria for questionable mild COPD.         Carmen Rodríguez MD CB / ANNA   D:  10/06/2017   13:20   T:  10/06/2017   13:41   Job #:  [id number]

## 2018-02-02 DIAGNOSIS — I10 ESSENTIAL HYPERTENSION, BENIGN: ICD-10-CM

## 2018-02-02 RX ORDER — VALSARTAN 320 MG/1
320 TABLET ORAL DAILY
Qty: 30 TAB | Refills: 12 | Status: SHIPPED | OUTPATIENT
Start: 2018-02-02 | End: 2018-09-11

## 2018-02-02 NOTE — TELEPHONE ENCOUNTER
Last office visit-10/5/17  Next office visit- no upcoming  Last refill-     Requested Prescriptions     Pending Prescriptions Disp Refills    valsartan (DIOVAN) 320 mg tablet 30 Tab 12     Sig: Take 1 Tab by mouth daily.

## 2018-05-07 ENCOUNTER — OFFICE VISIT (OUTPATIENT)
Dept: INTERNAL MEDICINE CLINIC | Age: 63
End: 2018-05-07

## 2018-05-07 VITALS
HEIGHT: 73 IN | WEIGHT: 217 LBS | BODY MASS INDEX: 28.76 KG/M2 | HEART RATE: 63 BPM | RESPIRATION RATE: 15 BRPM | TEMPERATURE: 99.2 F | OXYGEN SATURATION: 97 % | DIASTOLIC BLOOD PRESSURE: 94 MMHG | SYSTOLIC BLOOD PRESSURE: 154 MMHG

## 2018-05-07 DIAGNOSIS — I10 ESSENTIAL HYPERTENSION, BENIGN: ICD-10-CM

## 2018-05-07 DIAGNOSIS — M62.830 BACK SPASM: Primary | ICD-10-CM

## 2018-05-07 DIAGNOSIS — Z87.891 HISTORY OF TOBACCO USE: ICD-10-CM

## 2018-05-07 RX ORDER — CYCLOBENZAPRINE HCL 10 MG
10 TABLET ORAL
Qty: 20 TAB | Refills: 0 | Status: SHIPPED | OUTPATIENT
Start: 2018-05-07 | End: 2019-01-02

## 2018-05-07 RX ORDER — FUROSEMIDE 40 MG/1
TABLET ORAL
COMMUNITY
Start: 2018-04-17 | End: 2018-05-07

## 2018-05-07 RX ORDER — HYDROCODONE BITARTRATE AND ACETAMINOPHEN 5; 325 MG/1; MG/1
TABLET ORAL
COMMUNITY
Start: 2018-03-06 | End: 2018-05-07

## 2018-05-07 NOTE — PROGRESS NOTES
Primary Care Doctor is:  Dexter Edgar MD    Back Pain (Pt c/o back pain since last Thursday. Denies injury.)       LAST VISIT:     HPI:  Eber Gonzalez is a 60y.o. year old male who is here for an acute care visit and has the following concerns:    Pulled muscle mid back right side last Thursday. No injury. Woke up with it. Bending is impossible . No radiation. BP may be up since his pain these days    Not exercising at all. Assessment and Plan        1. Back spasm  TIME OUT performed immediately prior to start of procedure:   IBetty MD, have performed the following reviews on Eber Gonzalez   prior to the start of the procedure:     * Patient was identified by name and date of birth   * Agreement on procedure being performed was verified   * Risks and Benefits explained to the patient   * Procedure site verified and marked as necessary   * Patient was positioned for comfort   * Consent was given by patient    Date of procedure: 5/7/2018  Procedure performed by:Chan Fernandez MD   Patient assisted by: self   How tolerated by patient: tolerated the procedure well with no complications   Comments: none         Patient explained the risks and benefits of acupuncture to help with the acute problem. There can be some soreness afterwards and the effect can be immediate to slightly delayed (2-3 days). If the problem persists or gets worse, please call back or send a my chart message. If you experience shortness of breath, please let me know immediately. Patient agreed to proceed with treatment. Seirin packaged needle used No. 5 (0.25) 30 mm  QTY 3  Points palpated and inserted 5-10 mm at marked points  Patient tolerated procedure and felt partial. relief. - cyclobenzaprine (FLEXERIL) 10 mg tablet; Take 1 Tab by mouth daily as needed for Muscle Spasm(s). Dispense: 20 Tab; Refill: 0    2. Essential hypertension, benign  Tolerating medication.   Denies dizziness that is positional, SOB, or chest pain. Understands the importance of compliance to reduce risk of future heart failure. Agreed to call if any of above symptoms develop and  stay on current regimen of    Key CAD CHF Meds             valsartan (DIOVAN) 320 mg tablet  (Taking) Take 1 Tab by mouth daily. atorvastatin (LIPITOR) 40 mg tablet  (Taking) Take 1 Tab by mouth daily. aspirin delayed-release 81 mg tablet  (Taking) Take 1 Tab by mouth daily. furosemide (LASIX) 40 mg tablet            3. History of tobacco use  High risk for heart disease. Recent negative stress test        Visit Vitals    BP (!) 154/94 (BP 1 Location: Left arm, BP Patient Position: Sitting)    Pulse 63    Temp 99.2 °F (37.3 °C) (Oral)    Resp 15    Ht 6' 1\" (1.854 m)    Wt 217 lb (98.4 kg)    SpO2 97%    BMI 28.63 kg/m2         Historical Data    Past Medical History:   Diagnosis Date    Back problem     BPH (benign prostatic hyperplasia)     Elevated PSA measurement     H/O prostate biopsy     x 3    Hyperlipemia     Hypertension     Osteoarthritis     Prediabetes        Past Surgical History:   Procedure Laterality Date    HX CATARACT REMOVAL Bilateral 1/29/07 & 8/27/08    HX ORTHOPAEDIC      multiple sites - both knees    HX ORTHOPAEDIC Right 2014    R Hand trigger fingers 2 & 3    HX ORTHOPAEDIC Right 2010    Rot Cuff Rep & R elbow tendon    HX OTHER SURGICAL Right May 2016    Retinal Tear    HX OTHER SURGICAL  early 2000s    Prostate Bx x3    HX OTHER SURGICAL Left 6/2016     retna repair    HX TONSILLECTOMY  1970       Outpatient Encounter Prescriptions as of 5/7/2018   Medication Sig Dispense Refill    valsartan (DIOVAN) 320 mg tablet Take 1 Tab by mouth daily. 30 Tab 12    clomiPHENE (CLOMID) 50 mg tablet Take 50 mg by mouth daily.  atorvastatin (LIPITOR) 40 mg tablet Take 1 Tab by mouth daily. 30 Tab 11    aspirin delayed-release 81 mg tablet Take 1 Tab by mouth daily.  30 Tab 11    multivitamin (ONE A DAY) tablet Take 1 Tab by mouth daily.  furosemide (LASIX) 40 mg tablet       HYDROcodone-acetaminophen (NORCO) 5-325 mg per tablet        No facility-administered encounter medications on file as of 5/7/2018. No Known Allergies     Social History     Social History    Marital status:      Spouse name: N/A    Number of children: N/A    Years of education: N/A     Occupational History          Social History Main Topics    Smoking status: Current Every Day Smoker     Packs/day: 0.50     Years: 40.00     Types: Cigarettes    Smokeless tobacco: Never Used      Comment: E-Cig? - started in his teens    Alcohol use Yes      Comment: 1x/wk    Drug use: No    Sexual activity: Yes     Partners: Female     Other Topics Concern    Not on file     Social History Narrative        family history includes Cancer in his father; Crohn's Disease in his sister; Diabetes in his father and mother; Heart Attack (age of onset: 36) in his father; Heart Disease in his mother. Review of Systems   Constitutional: Negative for weight loss. Eyes: Negative for blurred vision. Respiratory: Negative for shortness of breath. Cardiovascular: Negative for chest pain. Gastrointestinal: Negative for abdominal pain. Genitourinary: Negative for dysuria and frequency. Skin: Negative for rash. Neurological: Negative for dizziness, focal weakness, weakness and headaches. Endo/Heme/Allergies: Negative for environmental allergies. Does not bruise/bleed easily. Physical Exam   Musculoskeletal:        Lumbar back: He exhibits decreased range of motion, tenderness, pain and spasm. He exhibits no bony tenderness, no swelling, no edema and no deformity. Back:      Ortho Exam           I have reviewed the patient's medical history in detail and updated the computerized patient record.      We had a prolonged discussion about these complex clinical issues and went over the various important aspects to consider. All questions were answered. Advised him to call back or return to office if symptoms do not improve, change in nature, or persist.    He was given an after visit summary or informed of [employer] [facility] which includes patient instructions, diagnoses, current medications, & vitals. He expressed understanding with the diagnosis and plan.

## 2018-05-07 NOTE — PROGRESS NOTES
Chief Complaint   Patient presents with    Back Pain     Pt c/o back pain since last Thursday. Denies injury. 1. Have you been to the ER, urgent care clinic since your last visit? Hospitalized since your last visit? No    2. Have you seen or consulted any other health care providers outside of the [address] [place] [address] since your last visit? Include any pap smears or colon screening.  No

## 2018-09-11 ENCOUNTER — TELEPHONE (OUTPATIENT)
Dept: INTERNAL MEDICINE CLINIC | Age: 63
End: 2018-09-11

## 2018-09-11 RX ORDER — OLMESARTAN MEDOXOMIL 40 MG/1
40 TABLET ORAL DAILY
Qty: 90 TAB | Refills: 3 | Status: SHIPPED | OUTPATIENT
Start: 2018-09-11 | End: 2019-01-02 | Stop reason: DRUGHIGH

## 2018-09-11 NOTE — TELEPHONE ENCOUNTER
----- Message from Rachelle Samayoa sent at 9/10/2018  5:19 PM EDT -----  Regarding: Dr. Sharron Lake Telephone  Pt is requesting a call back in regards to getting a substitue bp medication sent to [facility] rather than Valsartan. Best contact number: [phone].

## 2019-01-02 ENCOUNTER — OFFICE VISIT (OUTPATIENT)
Dept: INTERNAL MEDICINE CLINIC | Age: 64
End: 2019-01-02

## 2019-01-02 VITALS
SYSTOLIC BLOOD PRESSURE: 140 MMHG | HEIGHT: 73 IN | TEMPERATURE: 97.9 F | OXYGEN SATURATION: 98 % | WEIGHT: 213.6 LBS | HEART RATE: 67 BPM | BODY MASS INDEX: 28.31 KG/M2 | RESPIRATION RATE: 16 BRPM | DIASTOLIC BLOOD PRESSURE: 88 MMHG

## 2019-01-02 DIAGNOSIS — R73.03 PREDIABETES: ICD-10-CM

## 2019-01-02 DIAGNOSIS — Z00.00 ROUTINE GENERAL MEDICAL EXAMINATION AT A HEALTH CARE FACILITY: Primary | ICD-10-CM

## 2019-01-02 DIAGNOSIS — Z87.891 HISTORY OF TOBACCO USE: ICD-10-CM

## 2019-01-02 DIAGNOSIS — Z12.11 COLON CANCER SCREENING: ICD-10-CM

## 2019-01-02 DIAGNOSIS — E78.5 HYPERLIPIDEMIA, UNSPECIFIED HYPERLIPIDEMIA TYPE: ICD-10-CM

## 2019-01-02 DIAGNOSIS — I10 ESSENTIAL HYPERTENSION, BENIGN: ICD-10-CM

## 2019-01-02 RX ORDER — OLMESARTAN MEDOXOMIL 40 MG/1
40 TABLET ORAL DAILY
Qty: 90 TAB | Refills: 3 | Status: SHIPPED | OUTPATIENT
Start: 2019-01-02 | End: 2019-01-02 | Stop reason: DRUGHIGH

## 2019-01-02 RX ORDER — FUROSEMIDE 40 MG/1
TABLET ORAL DAILY
COMMUNITY

## 2019-01-02 RX ORDER — OLMESARTAN MEDOXOMIL AND HYDROCHLOROTHIAZIDE 40/25 40; 25 MG/1; MG/1
1 TABLET ORAL DAILY
Qty: 90 TAB | Refills: 3 | Status: SHIPPED | OUTPATIENT
Start: 2019-01-02 | End: 2019-03-21 | Stop reason: SDUPTHER

## 2019-01-02 RX ORDER — DOXYCYCLINE HYCLATE 20 MG
TABLET ORAL
COMMUNITY
Start: 2018-11-08

## 2019-01-02 RX ORDER — ATORVASTATIN CALCIUM 40 MG/1
40 TABLET, FILM COATED ORAL DAILY
Qty: 90 TAB | Refills: 3 | Status: SHIPPED | OUTPATIENT
Start: 2019-01-02 | End: 2019-03-21 | Stop reason: SDUPTHER

## 2019-01-02 NOTE — PROGRESS NOTES
Chief Complaint Patient presents with  Complete Physical  
 
Reviewed record in preparation for visit and have obtained necessary documentation. Identified pt with two pt identifiers(name and ). Health Maintenance Due Topic  Hepatitis C Screening  Shingrix Vaccine Age 50> (1 of 2)  COLONOSCOPY Chief Complaint Patient presents with  Complete Physical  
  
 
Wt Readings from Last 3 Encounters:  
19 213 lb 9.6 oz (96.9 kg) 18 217 lb (98.4 kg) 10/05/17 209 lb (94.8 kg) Temp Readings from Last 3 Encounters:  
19 97.9 °F (36.6 °C) (Oral) 18 99.2 °F (37.3 °C) (Oral) 10/05/17 98.4 °F (36.9 °C) (Oral) BP Readings from Last 3 Encounters:  
19 140/88  
18 (!) 154/94  
10/05/17 130/70 Pulse Readings from Last 3 Encounters:  
19 67  
18 63  
10/05/17 74 Learning Assessment: 
:  
 
Learning Assessment 2019 10/5/2017 2015 3/6/2014 PRIMARY LEARNER Patient Patient Patient Patient HIGHEST LEVEL OF EDUCATION - PRIMARY LEARNER  SOME COLLEGE - SOME COLLEGE SOME COLLEGE  
BARRIERS PRIMARY LEARNER NONE - NONE NONE  
CO-LEARNER CAREGIVER No - No No  
PRIMARY LANGUAGE ENGLISH ENGLISH ENGLISH ENGLISH  NEED - - No No  
LEARNER PREFERENCE PRIMARY DEMONSTRATION DEMONSTRATION DEMONSTRATION VIDEOS  
LEARNING SPECIAL TOPICS - - no no ANSWERED BY patient Patient  patient patient RELATIONSHIP SELF SELF SELF SELF Depression Screening: 
:  
 
PHQ over the last two weeks 2019 Little interest or pleasure in doing things Not at all Feeling down, depressed, irritable, or hopeless Not at all Total Score PHQ 2 0 Fall Risk Assessment: 
:  
 
Fall Risk Assessment, last 12 mths 10/5/2017 Able to walk? Yes Fall in past 12 months? No  
 
 
Abuse Screening: 
:  
 
Abuse Screening Questionnaire 2019 10/5/2017 2015 2014 3/6/2014 Do you ever feel afraid of your partner? N N N N N Are you in a relationship with someone who physically or mentally threatens you? N N N N N Is it safe for you to go home? Elyssa Martinez Coordination of Care Questionnaire: 
:  
 
1) Have you been to an emergency room, urgent care clinic since your last visit? no  
Hospitalized since your last visit? no          
 
2) Have you seen or consulted any other health care providers outside of [address] [place] [address] since your last visit? no  (Include any pap smears or colon screenings in this section.) 3) Do you have an Advance Directive on file? no 
 
4) Are you interested in receiving information on Advance Directives? NO Patient is accompanied by self I have received verbal consent from Alvin Rodriguez to discuss any/all medical information while they are present in the room. Reviewed record  In preparation for visit and have obtained necessary documentation.

## 2019-01-02 NOTE — PATIENT INSTRUCTIONS
Stop Benicar Add Benicar HCT Hydrochlorothiazide (By mouth) Hydrochlorothiazide (pastor-droe-klor-fj-SKSE-g-zide) Treats high blood pressure and fluid retention (edema). This medicine is a diuretic (water pill). Brand Name(s): Microzide There may be other brand names for this medicine. When This Medicine Should Not Be Used: This medicine is not right for everyone. Do not use this medicine if you had an allergic reaction to hydrochlorothiazide or a sulfa drug. How to Use This Medicine:  
Capsule, Liquid, Tablet · Take your medicine as directed. Your dose may need to be changed several times to find what works best for you. · Measure the oral liquid medicine with a marked measuring spoon, oral syringe, or medicine cup. · Missed dose: Take a dose as soon as you remember. If it is almost time for your next dose, wait until then and take a regular dose. Do not take extra medicine to make up for a missed dose. · Store the medicine in a closed container at room temperature, away from heat, moisture, and direct light. Drugs and Foods to Avoid: Ask your doctor or pharmacist before using any other medicine, including over-the-counter medicines, vitamins, and herbal products. · Some medicines and foods can affect how hydrochlorothiazide works. Tell your doctor if you are also using any of the following: ¨ Cholestyramine, colestipol, digoxin, lithium, insulin or other diabetes medicine ¨ An NSAID pain or arthritis medicine (such as aspirin, diclofenac, ibuprofen, naproxen, celecoxib), or a steroid medicine (such as hydrocortisone, methylprednisolone, prednisone, prednisolone, dexamethasone) Warnings While Using This Medicine: · Tell your doctor if you are pregnant or breastfeeding, or if you have kidney disease, liver disease, heart disease or heart failure, high cholesterol, diabetes, gout, trouble urinating, or lupus. · This medicine may cause the following problems: ¨ Glaucoma and other vision problems ¨ Acute gout ¨ Damage to the parathyroid gland ¨ Low or high levels of minerals in your blood (including potassium and sodium) · This medicine may make you dizzy. Do not drive or do anything else that could be dangerous until you know how this medicine affects you. · This medicine could lower your blood pressure too much, especially when you first use it or if you are dehydrated. Stand or sit up slowly if you feel lightheaded or dizzy. Alcohol may make this problem worse. · Tell any doctor or dentist who treats you that you are using this medicine. · Your doctor will check your progress and the effects of this medicine at regular visits. Keep all appointments. · Keep all medicine out of the reach of children. Never share your medicine with anyone. Possible Side Effects While Using This Medicine:  
Call your doctor right away if you notice any of these side effects: · Allergic reaction: Itching or hives, swelling in your face or hands, swelling or tingling in your mouth or throat, chest tightness, trouble breathing · Blistering, peeling, or red skin rash · Confusion, weakness, and muscle twitching · Dry mouth, increased thirst, muscle cramps, nausea or vomiting, uneven heartbeat · Lightheadedness, dizziness, or fainting · Nausea, vomiting, unusual tiredness or weakness, muscle cramps, confusion · Trouble seeing, eye pain, blurred vision or other vision changes If you notice these less serious side effects, talk with your doctor:  
· Headache · Mild diarrhea, constipation, nausea If you notice other side effects that you think are caused by this medicine, tell your doctor. Call your doctor for medical advice about side effects. You may report side effects to FDA at 7-333-FDA-2816 © 2017 [facility] Information is for End User's use only and may not be sold, redistributed or otherwise used for commercial purposes. The above information is an  only. It is not intended as medical advice for individual conditions or treatments. Talk to your doctor, nurse or pharmacist before following any medical regimen to see if it is safe and effective for you.

## 2019-01-02 NOTE — PROGRESS NOTES
HPI: 
Diana Mo is a 58y.o. year old male who is here for an annual physical: 
LAST SEEN: Visit date not found Wt Readings from Last 3 Encounters:  
01/02/19 213 lb 9.6 oz (96.9 kg) 05/07/18 217 lb (98.4 kg) 10/05/17 209 lb (94.8 kg) Temp Readings from Last 3 Encounters:  
01/02/19 97.9 °F (36.6 °C) (Oral) 05/07/18 99.2 °F (37.3 °C) (Oral) 10/05/17 98.4 °F (36.9 °C) (Oral) BP Readings from Last 3 Encounters:  
01/02/19 140/88  
05/07/18 (!) 154/94  
10/05/17 130/70 Pulse Readings from Last 3 Encounters:  
01/02/19 67  
05/07/18 63  
10/05/17 74 He reports the following history and medical concerns:   
 
Not sure why he is on lasix Tdap 2015 Discussed smoking cessation. Past attempts discussed. Assessment and Plan 1. Routine general medical examination at a health care facility Smoking is his biggest issue. Wife smokes also. Ok to go back to electronic cigarettes as at least less toxins.  
- CBC WITH AUTOMATED DIFF 
- LIPID PANEL 
- TSH REFLEX TO T4 
- METABOLIC PANEL, COMPREHENSIVE 
- HEMOGLOBIN A1C WITH EAG 
- MICROALBUMIN, UR, RAND W/ MICROALB/CREAT RATIO 
- UA/M W/RFLX CULTURE, ROUTINE 
- HEMOGLOBIN A1C WITH EAG 2. Prediabetes Recheck a1c. Wt Readings from Last 3 Encounters:  
01/02/19 213 lb 9.6 oz (96.9 kg) 05/07/18 217 lb (98.4 kg) 10/05/17 209 lb (94.8 kg)  
  
- HEMOGLOBIN A1C WITH EAG 3. Essential hypertension, benign Add hctz to benicar as BP high. Checks at home. Maybe doesn't need lasix and to take prn. He will re-establish with Dr. Don Ricci. Old notes reviewed. No CHF fx and no diastolic dysfunction on echo. 4. Hyperlipidemia, unspecified hyperlipidemia type The nature of cardiac risk has been fully discussed with this patient. I have made him aware of his LDL target goal given his cardiovascular risk analysis. I have discussed the appropriate diet.  The need for lifelong compliance in order to reduce risk is stressed. A regular exercise program is recommended to help achieve and maintain normal body weight, fitness and improve lipid balance. The risks and benefits of medications were discussed. Last cholesterol labs reviewed with patient. Patient made aware to get liver checked every 6 months. Continue with  Lipitor. Hx of plaque on arteries 5. History of tobacco use It is very important that he quit smoking. There are various alternatives available to help with this difficult task, but first and foremost, he must make a firm commitment and decision to quit. The nature of nicotine addiction is discussed and that a program like [employer] has proven to be the most helpful. The usefulness of behavioral therapy is discussed and suggested. Patient is aware that He can discuss with me anytime if he decides he wants to quit smoking or discuss medications like zyban or chantix for assistance. The long term consequences of smoking besides cancer, but heart disease, loss of limb/amputation and chronic oxygen therapy were made known.  
- CT LOW DOSE LUNG CANCER SCREENING; Future 6. Colon cancer screening Please get colonoscopy- Referral given to patient and emphasized importance for patient to schedule the appointment by calling the number on the paperwork. If there is any difficulty getting an appointment, please let us know. Not making this appointment can have serious consequences of delayed diagnosis or irreversible health defects.  
 
- REFERRAL FOR COLONOSCOPY Historical Data Vitals:  
 01/02/19 0810 BP: 140/88 Pulse: 67 Resp: 16 Temp: 97.9 °F (36.6 °C) TempSrc: Oral  
SpO2: 98% Weight: 213 lb 9.6 oz (96.9 kg) Height: 6' 1\" (1.854 m) Past Medical History:  
Diagnosis Date  Back problem  BPH (benign prostatic hyperplasia)  Elevated PSA measurement  H/O prostate biopsy   
 x 3  
 Hyperlipemia  Hypertension  Osteoarthritis  Prediabetes Past Surgical History:  
Procedure Laterality Date  HX CATARACT REMOVAL Bilateral 1/29/07 & 8/27/08  HX ORTHOPAEDIC    
 multiple sites - both knees  HX ORTHOPAEDIC Right 2014 R Hand trigger fingers 2 & 3  
 HX ORTHOPAEDIC Right 2010 Rot Cuff Rep & R elbow tendon  HX OTHER SURGICAL Right May 2016 Retinal Tear  HX OTHER SURGICAL  early 2000s Prostate Bx x3  
 HX OTHER SURGICAL Left 6/2016   
 retna repair [facility] Encounter Medications as of 1/2/2019 Medication Sig Dispense Refill  furosemide (LASIX) 40 mg tablet Take  by mouth daily.  doxycycline (PERIOSTAT) 20 mg tablet  atorvastatin (LIPITOR) 40 mg tablet Take 1 Tab by mouth daily. 90 Tab 3  
 olmesartan-hydroCHLOROthiazide (BENICAR HCT) 40-25 mg per tablet Take 1 Tab by mouth daily. 90 Tab 3  
 aspirin delayed-release 81 mg tablet Take 1 Tab by mouth daily. 30 Tab 11  
 multivitamin (ONE A DAY) tablet Take 1 Tab by mouth daily.  [DISCONTINUED] olmesartan (BENICAR) 40 mg tablet Take 1 Tab by mouth daily. 90 Tab 3  [DISCONTINUED] olmesartan (BENICAR) 40 mg tablet Take 1 Tab by mouth daily. 90 Tab 3  [DISCONTINUED] cyclobenzaprine (FLEXERIL) 10 mg tablet Take 1 Tab by mouth daily as needed for Muscle Spasm(s). 20 Tab 0  [DISCONTINUED] clomiPHENE (CLOMID) 50 mg tablet Take 50 mg by mouth daily.  [DISCONTINUED] atorvastatin (LIPITOR) 40 mg tablet Take 1 Tab by mouth daily. 30 Tab 11 No facility-administered encounter medications on file as of 1/2/2019. No Known Allergies Social History Socioeconomic History  Marital status:  Spouse name: Not on file  Number of children: Not on file  Years of education: Not on file  Highest education level: Not on file Social Needs  Financial resource strain: Not on file  Food insecurity - worry: Not on file  Food insecurity - inability: Not on file  Transportation needs - medical: Not on file  Transportation needs - non-medical: Not on file Occupational History  Occupation:  Tobacco Use  Smoking status: Current Every Day Smoker Packs/day: 0.50 Years: 40.00 Pack years: 20.00 Types: Cigarettes  Smokeless tobacco: Never Used  Tobacco comment: E-Cig? - started in his teens Substance and Sexual Activity  Alcohol use: Yes Comment: 1x/wk  Drug use: No  
 Sexual activity: Yes  
  Partners: Female Other Topics Concern  Not on file Social History Narrative  Not on file  
  
 
family history includes Cancer in his father; Crohn's Disease in his sister; Diabetes in his father and mother; Heart Attack (age of onset: 36) in his father; Heart Disease in his mother. Review of Systems Constitutional: Negative for chills, diaphoresis, fever, malaise/fatigue and weight loss. HENT: Negative for hearing loss. Respiratory: Negative for cough. Cardiovascular: Negative for chest pain. Gastrointestinal: Negative for blood in stool and constipation. Genitourinary: Negative for dysuria, flank pain, frequency and urgency. Musculoskeletal: Negative for myalgias. Skin: Negative for rash. Neurological: Negative for dizziness, weakness and headaches. Endo/Heme/Allergies: Does not bruise/bleed easily. Visit Vitals /88 (BP 1 Location: Left arm, BP Patient Position: Sitting) Pulse 67 Temp 97.9 °F (36.6 °C) (Oral) Resp 16 Ht 6' 1\" (1.854 m) Wt 213 lb 9.6 oz (96.9 kg) SpO2 98% BMI 28.18 kg/m² Physical Exam  
Constitutional: He is oriented to person, place, and time and well-developed, well-nourished, and in no distress. No distress. HENT:  
Nose: Nose normal.  
Mouth/Throat: Oropharynx is clear and moist. No oropharyngeal exudate. Eyes: Conjunctivae and EOM are normal. Left eye exhibits no discharge. Neck: Normal range of motion. Neck supple. No thyromegaly present. Cardiovascular: Normal rate, regular rhythm and normal heart sounds. Exam reveals no friction rub. No murmur heard. Pulmonary/Chest: Effort normal and breath sounds normal. No respiratory distress. He has no wheezes. He has no rales. Abdominal: Soft. Bowel sounds are normal. He exhibits no distension. There is no tenderness. Musculoskeletal: Normal range of motion. He exhibits no edema, tenderness or deformity. Lymphadenopathy:  
  He has no cervical adenopathy. Neurological: He is alert and oriented to person, place, and time. He exhibits normal muscle tone. Coordination normal.  
Skin: Skin is warm and dry. No rash noted. No erythema. Psychiatric: Mood and affect normal.  
 
Ortho Exam  
 
Assessment:  See Above for discussion/plan. Annual Physical completed. Counseling and risk factor reduction topics were discussed such as smoking cessation, pt will get colonoscopy this year. Regular exercise I have reviewed the patient's medical history in detail and updated the computerized patient record. We had a prolonged discussion about these complex clinical issues and went over the various important aspects to consider. All questions were answered. Advised him to call back or return to office if symptoms do not improve, change in nature, or persist. 
 
He was given an after visit summary or informed of [facility] which includes patient instructions, diagnoses, current medications, & vitals. He expressed understanding with the diagnosis and plan.

## 2019-01-03 LAB
ALBUMIN SERPL-MCNC: 4.6 G/DL (ref 3.6–4.8)
ALBUMIN/CREAT UR: 34.3 MG/G CREAT (ref 0–30)
ALBUMIN/GLOB SERPL: 2.1 {RATIO} (ref 1.2–2.2)
ALP SERPL-CCNC: 100 IU/L (ref 39–117)
ALT SERPL-CCNC: 29 IU/L (ref 0–44)
APPEARANCE UR: CLEAR
AST SERPL-CCNC: 14 IU/L (ref 0–40)
BACTERIA #/AREA URNS HPF: NORMAL /[HPF]
BASOPHILS # BLD AUTO: 0 X10E3/UL (ref 0–0.2)
BASOPHILS NFR BLD AUTO: 0 %
BILIRUB SERPL-MCNC: 0.9 MG/DL (ref 0–1.2)
BILIRUB UR QL STRIP: NEGATIVE
BUN SERPL-MCNC: 20 MG/DL (ref 8–27)
BUN/CREAT SERPL: 23 (ref 10–24)
CALCIUM SERPL-MCNC: 9.3 MG/DL (ref 8.6–10.2)
CASTS URNS QL MICRO: NORMAL /LPF
CHLORIDE SERPL-SCNC: 108 MMOL/L (ref 96–106)
CHOLEST SERPL-MCNC: 158 MG/DL (ref 100–199)
CO2 SERPL-SCNC: 24 MMOL/L (ref 20–29)
COLOR UR: YELLOW
CREAT SERPL-MCNC: 0.88 MG/DL (ref 0.76–1.27)
CREAT UR-MCNC: 79 MG/DL
EOSINOPHIL # BLD AUTO: 0 X10E3/UL (ref 0–0.4)
EOSINOPHIL NFR BLD AUTO: 0 %
EPI CELLS #/AREA URNS HPF: NORMAL /HPF
ERYTHROCYTE [DISTWIDTH] IN BLOOD BY AUTOMATED COUNT: 14 % (ref 12.3–15.4)
EST. AVERAGE GLUCOSE BLD GHB EST-MCNC: 160 MG/DL
GLOBULIN SER CALC-MCNC: 2.2 G/DL (ref 1.5–4.5)
GLUCOSE SERPL-MCNC: 137 MG/DL (ref 65–99)
GLUCOSE UR QL: NEGATIVE
HBA1C MFR BLD: 7.2 % (ref 4.8–5.6)
HCT VFR BLD AUTO: 49.2 % (ref 37.5–51)
HDLC SERPL-MCNC: 36 MG/DL
HGB BLD-MCNC: 16.4 G/DL (ref 13–17.7)
HGB UR QL STRIP: NEGATIVE
IMM GRANULOCYTES # BLD: 0 X10E3/UL (ref 0–0.1)
IMM GRANULOCYTES NFR BLD: 0 %
INTERPRETATION, 910389: NORMAL
KETONES UR QL STRIP: NEGATIVE
LDLC SERPL CALC-MCNC: 81 MG/DL (ref 0–99)
LEUKOCYTE ESTERASE UR QL STRIP: NEGATIVE
LYMPHOCYTES # BLD AUTO: 1.8 X10E3/UL (ref 0.7–3.1)
LYMPHOCYTES NFR BLD AUTO: 16 %
Lab: NORMAL
MCH RBC QN AUTO: 30.4 PG (ref 26.6–33)
MCHC RBC AUTO-ENTMCNC: 33.3 G/DL (ref 31.5–35.7)
MCV RBC AUTO: 91 FL (ref 79–97)
MICRO URNS: NORMAL
MICRO URNS: NORMAL
MICROALBUMIN UR-MCNC: 27.1 UG/ML
MONOCYTES # BLD AUTO: 0.6 X10E3/UL (ref 0.1–0.9)
MONOCYTES NFR BLD AUTO: 5 %
MUCOUS THREADS URNS QL MICRO: PRESENT
NEUTROPHILS # BLD AUTO: 9 X10E3/UL (ref 1.4–7)
NEUTROPHILS NFR BLD AUTO: 79 %
NITRITE UR QL STRIP: NEGATIVE
PH UR STRIP: 7 [PH] (ref 5–7.5)
PLATELET # BLD AUTO: 165 X10E3/UL (ref 150–379)
POTASSIUM SERPL-SCNC: 4.3 MMOL/L (ref 3.5–5.2)
PROT SERPL-MCNC: 6.8 G/DL (ref 6–8.5)
PROT UR QL STRIP: NEGATIVE
RBC # BLD AUTO: 5.4 X10E6/UL (ref 4.14–5.8)
RBC #/AREA URNS HPF: NORMAL /HPF
SODIUM SERPL-SCNC: 148 MMOL/L (ref 134–144)
SP GR UR: 1.02 (ref 1–1.03)
TRIGL SERPL-MCNC: 205 MG/DL (ref 0–149)
TSH SERPL DL<=0.005 MIU/L-ACNC: 1.06 UIU/ML (ref 0.45–4.5)
URINALYSIS REFLEX, 377202: NORMAL
UROBILINOGEN UR STRIP-MCNC: 0.2 MG/DL (ref 0.2–1)
VLDLC SERPL CALC-MCNC: 41 MG/DL (ref 5–40)
WBC # BLD AUTO: 11.4 X10E3/UL (ref 3.4–10.8)
WBC #/AREA URNS HPF: NORMAL /HPF

## 2019-01-04 DIAGNOSIS — E11.8 CONTROLLED TYPE 2 DIABETES MELLITUS WITH COMPLICATION, WITHOUT LONG-TERM CURRENT USE OF INSULIN (HCC): Primary | ICD-10-CM

## 2019-01-04 RX ORDER — METFORMIN HYDROCHLORIDE 500 MG/1
500 TABLET, EXTENDED RELEASE ORAL
Qty: 90 TAB | Refills: 3 | Status: SHIPPED | OUTPATIENT
Start: 2019-01-04 | End: 2019-03-21 | Stop reason: SDUPTHER

## 2019-01-04 NOTE — PROGRESS NOTES
Discussed with patient. a1c high. Recheck in 3 months Start metformin er 500 mg at night Weight loss Low glycemic foods. Pt is motivated. Will help TG.

## 2019-01-08 ENCOUNTER — HOSPITAL ENCOUNTER (OUTPATIENT)
Dept: CT IMAGING | Age: 64
Discharge: HOME | End: 2019-01-08
Attending: INTERNAL MEDICINE
Payer: COMMERCIAL

## 2019-01-08 VITALS — BODY MASS INDEX: 26.56 KG/M2 | HEIGHT: 74 IN | WEIGHT: 207 LBS

## 2019-01-08 DIAGNOSIS — Z87.891 HISTORY OF TOBACCO USE: ICD-10-CM

## 2019-01-08 PROCEDURE — G0297 LDCT FOR LUNG CA SCREEN: HCPCS

## 2019-03-21 ENCOUNTER — OFFICE VISIT (OUTPATIENT)
Dept: INTERNAL MEDICINE CLINIC | Age: 64
End: 2019-03-21

## 2019-03-21 VITALS
WEIGHT: 209.6 LBS | DIASTOLIC BLOOD PRESSURE: 80 MMHG | SYSTOLIC BLOOD PRESSURE: 140 MMHG | HEART RATE: 84 BPM | OXYGEN SATURATION: 98 % | HEIGHT: 74 IN | BODY MASS INDEX: 26.9 KG/M2 | RESPIRATION RATE: 16 BRPM | TEMPERATURE: 98.5 F

## 2019-03-21 DIAGNOSIS — E78.5 HYPERLIPIDEMIA, UNSPECIFIED HYPERLIPIDEMIA TYPE: ICD-10-CM

## 2019-03-21 DIAGNOSIS — I10 ESSENTIAL HYPERTENSION, BENIGN: ICD-10-CM

## 2019-03-21 DIAGNOSIS — E11.8 CONTROLLED TYPE 2 DIABETES MELLITUS WITH COMPLICATION, WITHOUT LONG-TERM CURRENT USE OF INSULIN (HCC): Primary | ICD-10-CM

## 2019-03-21 LAB — HBA1C MFR BLD HPLC: 7.1 %

## 2019-03-21 RX ORDER — ATORVASTATIN CALCIUM 40 MG/1
40 TABLET, FILM COATED ORAL DAILY
Qty: 90 TAB | Refills: 3 | Status: SHIPPED | OUTPATIENT
Start: 2019-03-21

## 2019-03-21 RX ORDER — METFORMIN HYDROCHLORIDE 500 MG/1
500 TABLET, EXTENDED RELEASE ORAL
Qty: 90 TAB | Refills: 3 | Status: SHIPPED | OUTPATIENT
Start: 2019-03-21

## 2019-03-21 RX ORDER — OLMESARTAN MEDOXOMIL AND HYDROCHLOROTHIAZIDE 40/25 40; 25 MG/1; MG/1
1 TABLET ORAL DAILY
Qty: 90 TAB | Refills: 3 | Status: SHIPPED | OUTPATIENT
Start: 2019-03-21

## 2019-03-21 NOTE — PATIENT INSTRUCTIONS
Office Visit on 01/02/2019   Component Date Value Ref Range Status    WBC 01/02/2019 11.4* 3.4 - 10.8 x10E3/uL Final    RBC 01/02/2019 5.40  4. 14 - 5.80 x10E6/uL Final    HGB 01/02/2019 16.4  13.0 - 17.7 g/dL Final    HCT 01/02/2019 49.2  37.5 - 51.0 % Final    MCV 01/02/2019 91  79 - 97 fL Final    MCH 01/02/2019 30.4  26.6 - 33.0 pg Final    MCHC 01/02/2019 33.3  31.5 - 35.7 g/dL Final    RDW 01/02/2019 14.0  12.3 - 15.4 % Final    PLATELET 72/77/1039 958  150 - 379 x10E3/uL Final    NEUTROPHILS 01/02/2019 79  Not Estab. % Final    Lymphocytes 01/02/2019 16  Not Estab. % Final    MONOCYTES 01/02/2019 5  Not Estab. % Final    EOSINOPHILS 01/02/2019 0  Not Estab. % Final    BASOPHILS 01/02/2019 0  Not Estab. % Final    ABS. NEUTROPHILS 01/02/2019 9.0* 1.4 - 7.0 x10E3/uL Final    Abs Lymphocytes 01/02/2019 1.8  0.7 - 3.1 x10E3/uL Final    ABS. MONOCYTES 01/02/2019 0.6  0.1 - 0.9 x10E3/uL Final    ABS. EOSINOPHILS 01/02/2019 0.0  0.0 - 0.4 x10E3/uL Final    ABS. BASOPHILS 01/02/2019 0.0  0.0 - 0.2 x10E3/uL Final    IMMATURE GRANULOCYTES 01/02/2019 0  Not Estab. % Final    ABS. IMM.  GRANS. 01/02/2019 0.0  0.0 - 0.1 x10E3/uL Final    Cholesterol, total 01/02/2019 158  100 - 199 mg/dL Final    Triglyceride 01/02/2019 205* 0 - 149 mg/dL Final    HDL Cholesterol 01/02/2019 36* >39 mg/dL Final    VLDL, calculated 01/02/2019 41* 5 - 40 mg/dL Final    LDL, calculated 01/02/2019 81  0 - 99 mg/dL Final    TSH 01/02/2019 1.060  0.450 - 4.500 uIU/mL Final    Glucose 01/02/2019 137* 65 - 99 mg/dL Final    BUN 01/02/2019 20  8 - 27 mg/dL Final    Creatinine 01/02/2019 0.88  0.76 - 1.27 mg/dL Final    GFR est non-AA 01/02/2019 91  >59 mL/min/1.73 Final    GFR est AA 01/02/2019 105  >59 mL/min/1.73 Final    BUN/Creatinine ratio 01/02/2019 23  10 - 24 Final    Sodium 01/02/2019 148* 134 - 144 mmol/L Final    Potassium 01/02/2019 4.3  3.5 - 5.2 mmol/L Final    Chloride 01/02/2019 108* 96 - 106 mmol/L Final    CO2 01/02/2019 24  20 - 29 mmol/L Final    Calcium 01/02/2019 9.3  8.6 - 10.2 mg/dL Final    Protein, total 01/02/2019 6.8  6.0 - 8.5 g/dL Final    Albumin 01/02/2019 4.6  3.6 - 4.8 g/dL Final    GLOBULIN, TOTAL 01/02/2019 2.2  1.5 - 4.5 g/dL Final    A-G Ratio 01/02/2019 2.1  1.2 - 2.2 Final    Bilirubin, total 01/02/2019 0.9  0.0 - 1.2 mg/dL Final    Alk. phosphatase 01/02/2019 100  39 - 117 IU/L Final    AST (SGOT) 01/02/2019 14  0 - 40 IU/L Final    ALT (SGPT) 01/02/2019 29  0 - 44 IU/L Final    Hemoglobin A1c 01/02/2019 7.2* 4.8 - 5.6 % Final    Comment:          Prediabetes: 5.7 - 6.4           Diabetes: >6.4           Glycemic control for adults with diabetes: <7.0      Estimated average glucose 01/02/2019 160  mg/dL Final    Creatinine, urine 01/02/2019 79.0  Not Estab. mg/dL Final    Microalbumin, urine 01/02/2019 27.1  Not Estab. ug/mL Final    Microalb/Creat ratio (ug/mg creat.) 01/02/2019 34.3* 0.0 - 30.0 mg/g creat Final    Comment:                      Normal:                0.0 -  30.0                       Albuminuria:          31.0 - 300.0                       Clinical albuminuria:       >300.0      Specific Gravity 01/02/2019 1.017  1.005 - 1.030 Final    pH (UA) 01/02/2019 7.0  5.0 - 7.5 Final    Color 01/02/2019 Yellow  Yellow Final    Appearance 01/02/2019 Clear  Clear Final    Leukocyte Esterase 01/02/2019 Negative  Negative Final    Protein 01/02/2019 Negative  Negative/Trace Final    Glucose 01/02/2019 Negative  Negative Final    Ketone 01/02/2019 Negative  Negative Final    Blood 01/02/2019 Negative  Negative Final    Bilirubin 01/02/2019 Negative  Negative Final    Urobilinogen 01/02/2019 0.2  0.2 - 1.0 mg/dL Final    Nitrites 01/02/2019 Negative  Negative Final    Microscopic Examination 01/02/2019 Comment   Final    Microscopic follows if indicated.     Microscopic exam 01/02/2019 See additional order   Final    Microscopic was indicated and was performed.  URINALYSIS REFLEX 01/02/2019 Comment   Final    This specimen will not reflex to a Urine Culture.  WBC 01/02/2019 0-5  0 - 5 /hpf Final    RBC 01/02/2019 0-2  0 - 2 /hpf Final    Epithelial cells 01/02/2019 0-10  0 - 10 /hpf Final    Casts 01/02/2019 None seen  None seen /lpf Final    Mucus 01/02/2019 Present  Not Estab. Final    Bacteria 01/02/2019 Few  None seen/Few Final    INTERPRETATION 01/02/2019 Note   Final    Supplemental report is available.     PDF Image [id number] Not applicable   Final           Immunization History   Administered Date(s) Administered    Influenza Vaccine 10/23/2013, 11/01/2018    Influenza Vaccine (Quad) 01/16/2017    Influenza Vaccine (Quad) PF 09/19/2017    Influenza Vaccine PF 01/07/2015    Influenza Vaccine Whole 10/19/2010    Tdap 01/07/2015

## 2019-03-21 NOTE — PROGRESS NOTES
Medication Evaluation       HPI:  Fabiola Mckeon is a 58y.o. year old male who is here for a follow up visit. He was last seen by me on 1/2/2019. He reports the following:    No problems with metformin. Takes it at dinner    Wt Readings from Last 3 Encounters:   03/21/19 209 lb 9.6 oz (95.1 kg)   01/08/19 207 lb (93.9 kg)   01/02/19 213 lb 9.6 oz (96.9 kg)      Still smokes. Negative CT    Started to reduce carbs. Aware needs colonoscopy. He promises to get one    Hx of detached retina. Assessment and Plan        1. Controlled type 2 diabetes mellitus with complication, without long-term current use of insulin ([facility])  a1c didn't move that much. He wants to work more on diet and exercise. Get lower than 200 lbs. Reviewed diet. Stop breads, potatoes. Skips meals a lot. - metFORMIN ER (GLUCOPHAGE XR) 500 mg tablet; Take 1 Tab by mouth daily (with dinner). Dispense: 90 Tab; Refill: 3    2. Essential hypertension, benign  Tolerating medication. Denies dizziness that is positional, SOB, or chest pain. Understands the importance of compliance to reduce risk of future heart failure. Agreed to call if any of above symptoms develop and  stay on current regimen of    Key CAD CHF Meds             atorvastatin (LIPITOR) 40 mg tablet (Taking) Take 1 Tab by mouth daily. olmesartan-hydroCHLOROthiazide (BENICAR HCT) 40-25 mg per tablet (Taking) Take 1 Tab by mouth daily. aspirin delayed-release 81 mg tablet (Taking) Take 1 Tab by mouth daily. furosemide (LASIX) 40 mg tablet Take  by mouth daily. 3. Hyperlipidemia, unspecified hyperlipidemia type  The nature of cardiac risk has been fully discussed with this patient. I have made him aware of his LDL target goal given his cardiovascular risk analysis. I have discussed the appropriate diet. The need for lifelong compliance in order to reduce risk is stressed.  A regular exercise program is recommended to help achieve and maintain normal body weight, fitness and improve lipid balance. The risks and benefits of medications were discussed. Last cholesterol labs reviewed with patient. Patient made aware to get liver checked every 6 months. Continue with    Key Antihyperlipidemia Meds             atorvastatin (LIPITOR) 40 mg tablet (Taking) Take 1 Tab by mouth daily. Stop smoking is most important. Stay on aspirin 81 mg          Visit Vitals  /80 (BP 1 Location: Left arm, BP Patient Position: Sitting)   Pulse 84   Temp 98.5 °F (36.9 °C) (Oral)   Resp 16   Ht 6' 2\" (1.88 m)   Wt 209 lb 9.6 oz (95.1 kg)   SpO2 98%   BMI 26.91 kg/m²       Historical Data    Past Medical History:   Diagnosis Date    Back problem     BPH (benign prostatic hyperplasia)     Elevated PSA measurement     H/O prostate biopsy     x 3    Hyperlipemia     Hypertension     Osteoarthritis     Prediabetes        Past Surgical History:   Procedure Laterality Date    HX CATARACT REMOVAL Bilateral 1/29/07 & 8/27/08    HX ORTHOPAEDIC      multiple sites - both knees    HX ORTHOPAEDIC Right 2014    R Hand trigger fingers 2 & 3    HX ORTHOPAEDIC Right 2010    Rot Cuff Rep & R elbow tendon    HX OTHER SURGICAL Right May 2016    Retinal Tear    HX OTHER SURGICAL  early 2000s    Prostate Bx x3    HX OTHER SURGICAL Left 6/2016     retna repair    HX TONSILLECTOMY  1970       Outpatient Encounter Medications as of 3/21/2019   Medication Sig Dispense Refill    atorvastatin (LIPITOR) 40 mg tablet Take 1 Tab by mouth daily. 90 Tab 3    olmesartan-hydroCHLOROthiazide (BENICAR HCT) 40-25 mg per tablet Take 1 Tab by mouth daily. 90 Tab 3    metFORMIN ER (GLUCOPHAGE XR) 500 mg tablet Take 1 Tab by mouth daily (with dinner). 90 Tab 3    doxycycline (PERIOSTAT) 20 mg tablet       aspirin delayed-release 81 mg tablet Take 1 Tab by mouth daily. 30 Tab 11    multivitamin (ONE A DAY) tablet Take 1 Tab by mouth daily.       [DISCONTINUED] metFORMIN ER (GLUCOPHAGE XR) 500 mg tablet Take 1 Tab by mouth daily (with dinner). 90 Tab 3    furosemide (LASIX) 40 mg tablet Take  by mouth daily.  [DISCONTINUED] atorvastatin (LIPITOR) 40 mg tablet Take 1 Tab by mouth daily. 90 Tab 3    [DISCONTINUED] olmesartan-hydroCHLOROthiazide (BENICAR HCT) 40-25 mg per tablet Take 1 Tab by mouth daily. 90 Tab 3     No facility-administered encounter medications on file as of 3/21/2019.          No Known Allergies     Social History     Socioeconomic History    Marital status:      Spouse name: Not on file    Number of children: Not on file    Years of education: Not on file    Highest education level: Not on file   Occupational History    Occupation:    Social Needs    Financial resource strain: Not on file    Food insecurity:     Worry: Not on file     Inability: Not on file    Transportation needs:     Medical: Not on file     Non-medical: Not on file   Tobacco Use    Smoking status: Current Every Day Smoker     Packs/day: 0.50     Years: 40.00     Pack years: 20.00     Types: Cigarettes    Smokeless tobacco: Never Used    Tobacco comment: E-Cig? - started in his teens   Substance and Sexual Activity    Alcohol use: Yes     Comment: 1x/wk    Drug use: No    Sexual activity: Yes     Partners: Female   Lifestyle    Physical activity:     Days per week: Not on file     Minutes per session: Not on file    Stress: Not on file   Relationships    Social connections:     Talks on phone: Not on file     Gets together: Not on file     Attends [employer] service: Not on file     Active member of club or organization: Not on file     Attends meetings of clubs or organizations: Not on file     Relationship status: Not on file    Intimate partner violence:     Fear of current or ex partner: Not on file     Emotionally abused: Not on file     Physically abused: Not on file     Forced sexual activity: Not on file   Other Topics Concern    Not on file   Social History Narrative    Not on file        family history includes Cancer in his father; Crohn's Disease in his sister; Diabetes in his father and mother; Heart Attack (age of onset: 36) in his father; Heart Disease in his mother. Review of Systems   Constitutional: Negative for weight loss. Eyes: Negative for blurred vision. Respiratory: Negative for shortness of breath. Cardiovascular: Negative for chest pain. Gastrointestinal: Negative for abdominal pain. Genitourinary: Negative for dysuria and frequency. Skin: Negative for rash. Neurological: Negative for dizziness, focal weakness, weakness and headaches. Endo/Heme/Allergies: Negative for environmental allergies. Does not bruise/bleed easily. Physical Exam   Constitutional: He appears well-developed and well-nourished. He is active. Non-toxic appearance. He does not have a sickly appearance. He does not appear ill. No distress. Eyes: Conjunctivae are normal. Right eye exhibits no discharge. Neck: Carotid bruit is not present. No thyroid mass and no thyromegaly present. Cardiovascular: Normal rate, regular rhythm, S1 normal, S2 normal, normal heart sounds and normal pulses. Exam reveals no gallop and no friction rub. Pulmonary/Chest: Effort normal and breath sounds normal. No respiratory distress. Abdominal: Soft. Bowel sounds are normal.   Musculoskeletal: He exhibits no edema or deformity. Neurological: He is alert. Coordination normal.   Skin: Skin is warm and dry. No rash noted. No pallor. Psychiatric: He has a normal mood and affect. His behavior is normal.   Vitals reviewed. Ortho Exam      Orders Placed This Encounter    atorvastatin (LIPITOR) 40 mg tablet     Sig: Take 1 Tab by mouth daily. Dispense:  90 Tab     Refill:  3    olmesartan-hydroCHLOROthiazide (BENICAR HCT) 40-25 mg per tablet     Sig: Take 1 Tab by mouth daily.      Dispense:  90 Tab     Refill:  3    metFORMIN ER (GLUCOPHAGE XR) 500 mg tablet Sig: Take 1 Tab by mouth daily (with dinner). Dispense:  90 Tab     Refill:  3        I have reviewed the patient's medical history in detail and updated the computerized patient record. We had a prolonged discussion about these complex clinical issues and went over the various important aspects to consider. All questions were answered. Advised him to call back or return to office if symptoms do not improve, change in nature, or persist.    He was given an after visit summary or informed of [facility] which includes patient instructions, diagnoses, current medications, & vitals. He expressed understanding with the diagnosis and plan.

## 2019-03-21 NOTE — PROGRESS NOTES
Chief Complaint   Patient presents with    Medication Evaluation     Reviewed record in preparation for visit and have obtained necessary documentation. Identified pt with two pt identifiers(name and ). Health Maintenance Due   Topic    Hepatitis C Screening     Pneumococcal 0-64 years (1 of 1 - PPSV23)    FOOT EXAM Q1     EYE EXAM RETINAL OR DILATED     Shingrix Vaccine Age 49> (1 of 2)    COLONOSCOPY          Chief Complaint   Patient presents with    Medication Evaluation        Wt Readings from Last 3 Encounters:   19 209 lb 9.6 oz (95.1 kg)   19 207 lb (93.9 kg)   19 213 lb 9.6 oz (96.9 kg)     Temp Readings from Last 3 Encounters:   19 98.5 °F (36.9 °C) (Oral)   19 97.9 °F (36.6 °C) (Oral)   18 99.2 °F (37.3 °C) (Oral)     BP Readings from Last 3 Encounters:   19 140/80   19 140/88   18 (!) 154/94     Pulse Readings from Last 3 Encounters:   19 84   19 67   18 63           Learning Assessment:  :     Learning Assessment 2019 10/5/2017 2015 3/6/2014   PRIMARY LEARNER Patient Patient Patient Patient   HIGHEST LEVEL OF EDUCATION - PRIMARY LEARNER  47 [address] Rudolph   BARRIERS PRIMARY LEARNER NONE - NONE NONE   CO-LEARNER CAREGIVER No - No No   PRIMARY LANGUAGE ENGLISH ENGLISH ENGLISH ENGLISH    NEED - - No No   LEARNER PREFERENCE PRIMARY DEMONSTRATION DEMONSTRATION DEMONSTRATION VIDEOS   LEARNING SPECIAL TOPICS - - no no   ANSWERED BY patient Patient  patient patient   RELATIONSHIP SELF SELF SELF SELF       Depression Screening:  :     3 most recent PHQ Screens 2019   Little interest or pleasure in doing things Not at all   Feeling down, depressed, irritable, or hopeless Not at all   Total Score PHQ 2 0       Fall Risk Assessment:  :     Fall Risk Assessment, last 12 mths 10/5/2017   Able to walk? Yes   Fall in past 12 months?  No       Abuse Screening:  :     Abuse Screening Questionnaire 1/2/2019 10/5/2017 7/8/2015 4/9/2014 3/6/2014   Do you ever feel afraid of your partner? N N N N N   Are you in a relationship with someone who physically or mentally threatens you? N N N N N   Is it safe for you to go home? Y Y Y Y Y       Coordination of Care Questionnaire:  :     1) Have you been to an emergency room, urgent care clinic since your last visit? no   Hospitalized since your last visit? no             2) Have you seen or consulted any other health care providers outside of [address] [place] [address] since your last visit? no  (Include any pap smears or colon screenings in this section.)    3) Do you have an Advance Directive on file? no    4) Are you interested in receiving information on Advance Directives? NO      Patient is accompanied by self I have received verbal consent from Eber Gonzalez to discuss any/all medical information while they are present in the room. Reviewed record  In preparation for visit and have obtained necessary documentation.